# Patient Record
Sex: MALE | Race: WHITE
[De-identification: names, ages, dates, MRNs, and addresses within clinical notes are randomized per-mention and may not be internally consistent; named-entity substitution may affect disease eponyms.]

---

## 2017-01-06 ENCOUNTER — HOSPITAL ENCOUNTER (OUTPATIENT)
Age: 77
Discharge: HOME | End: 2017-01-06
Payer: MEDICARE

## 2017-01-06 DIAGNOSIS — I48.0: Primary | ICD-10-CM

## 2017-01-06 DIAGNOSIS — Z79.01: ICD-10-CM

## 2017-01-19 ENCOUNTER — HOSPITAL ENCOUNTER (OUTPATIENT)
Age: 77
Discharge: HOME | End: 2017-01-19
Payer: MEDICARE

## 2017-01-19 DIAGNOSIS — Z79.01: ICD-10-CM

## 2017-01-19 DIAGNOSIS — I48.0: Primary | ICD-10-CM

## 2017-02-03 ENCOUNTER — HOSPITAL ENCOUNTER (EMERGENCY)
Age: 77
Discharge: HOME | End: 2017-02-03
Payer: MEDICARE
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
## 2017-02-03 DIAGNOSIS — I10: ICD-10-CM

## 2017-02-03 DIAGNOSIS — Z79.02: ICD-10-CM

## 2017-02-03 DIAGNOSIS — Z79.01: ICD-10-CM
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
## 2017-02-03 DIAGNOSIS — I25.10: ICD-10-CM

## 2017-02-03 DIAGNOSIS — I48.91: ICD-10-CM

## 2017-02-03 DIAGNOSIS — S80.12XA: Primary | ICD-10-CM

## 2017-02-03 DIAGNOSIS — E03.9: ICD-10-CM
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
## 2017-02-03 PROCEDURE — 36415 COLL VENOUS BLD VENIPUNCTURE: CPT

## 2017-02-03 PROCEDURE — 73610 X-RAY EXAM OF ANKLE: CPT

## 2017-02-03 PROCEDURE — 83690 ASSAY OF LIPASE: CPT

## 2017-02-03 PROCEDURE — 85610 PROTHROMBIN TIME: CPT

## 2017-02-03 PROCEDURE — 99283 EMERGENCY DEPT VISIT LOW MDM: CPT

## 2017-02-03 PROCEDURE — 73590 X-RAY EXAM OF LOWER LEG: CPT

## 2017-02-03 PROCEDURE — 85025 COMPLETE CBC W/AUTO DIFF WBC: CPT

## 2017-02-03 PROCEDURE — 99284 EMERGENCY DEPT VISIT MOD MDM: CPT

## 2017-02-03 PROCEDURE — 80053 COMPREHEN METABOLIC PANEL: CPT

## 2017-02-22 ENCOUNTER — HOSPITAL ENCOUNTER (OUTPATIENT)
Age: 77
Discharge: HOME | End: 2017-02-22
Payer: MEDICARE

## 2017-02-22 DIAGNOSIS — Z79.01: ICD-10-CM

## 2017-02-22 DIAGNOSIS — I48.0: Primary | ICD-10-CM

## 2017-03-09 ENCOUNTER — HOSPITAL ENCOUNTER (OUTPATIENT)
Age: 77
Discharge: HOME | End: 2017-03-09
Payer: MEDICARE

## 2017-03-09 DIAGNOSIS — Z79.01: ICD-10-CM

## 2017-03-09 DIAGNOSIS — I48.0: Primary | ICD-10-CM

## 2017-03-23 ENCOUNTER — HOSPITAL ENCOUNTER (OUTPATIENT)
Age: 77
End: 2017-03-23
Payer: MEDICARE

## 2017-03-23 DIAGNOSIS — Z79.01: Primary | ICD-10-CM

## 2017-04-12 ENCOUNTER — HOSPITAL ENCOUNTER (OUTPATIENT)
Age: 77
Discharge: HOME | End: 2017-04-12
Payer: MEDICARE

## 2017-04-12 DIAGNOSIS — L03.116: Primary | ICD-10-CM

## 2017-05-04 ENCOUNTER — HOSPITAL ENCOUNTER (OUTPATIENT)
Age: 77
Discharge: HOME | End: 2017-05-04
Payer: MEDICARE

## 2017-05-04 DIAGNOSIS — I48.0: Primary | ICD-10-CM

## 2017-05-04 DIAGNOSIS — Z79.01: ICD-10-CM

## 2017-06-02 ENCOUNTER — HOSPITAL ENCOUNTER (OUTPATIENT)
Dept: HOSPITAL 76 - LAB.F | Age: 77
Discharge: HOME | End: 2017-06-02
Attending: PHARMACIST
Payer: MEDICARE

## 2017-06-02 DIAGNOSIS — I48.0: Primary | ICD-10-CM

## 2017-06-02 DIAGNOSIS — Z79.01: ICD-10-CM

## 2017-06-02 LAB
INR PPP: 2.9 (ref 0.8–1.2)
PROTHROM ACT/NOR PPP: 33.6 SECS (ref 9.9–12.6)

## 2017-06-02 PROCEDURE — 85610 PROTHROMBIN TIME: CPT

## 2017-06-02 PROCEDURE — 36415 COLL VENOUS BLD VENIPUNCTURE: CPT

## 2017-07-12 ENCOUNTER — HOSPITAL ENCOUNTER (OUTPATIENT)
Dept: HOSPITAL 76 - LAB.F | Age: 77
Discharge: HOME | End: 2017-07-12
Attending: PHARMACIST
Payer: MEDICARE

## 2017-07-12 DIAGNOSIS — I48.0: Primary | ICD-10-CM

## 2017-07-12 LAB
INR PPP: 2.3 (ref 0.8–1.2)
PROTHROM ACT/NOR PPP: 25.8 SECS (ref 9.9–12.6)

## 2017-07-12 PROCEDURE — 36415 COLL VENOUS BLD VENIPUNCTURE: CPT

## 2017-07-12 PROCEDURE — 85610 PROTHROMBIN TIME: CPT

## 2017-12-06 ENCOUNTER — HOSPITAL ENCOUNTER (EMERGENCY)
Dept: HOSPITAL 76 - ED | Age: 77
Discharge: HOME | End: 2017-12-06
Payer: MEDICARE

## 2017-12-06 VITALS — SYSTOLIC BLOOD PRESSURE: 143 MMHG | DIASTOLIC BLOOD PRESSURE: 100 MMHG

## 2017-12-06 DIAGNOSIS — E03.9: ICD-10-CM

## 2017-12-06 DIAGNOSIS — N40.0: ICD-10-CM

## 2017-12-06 DIAGNOSIS — I25.10: ICD-10-CM

## 2017-12-06 DIAGNOSIS — J40: Primary | ICD-10-CM

## 2017-12-06 DIAGNOSIS — G47.30: ICD-10-CM

## 2017-12-06 DIAGNOSIS — E78.00: ICD-10-CM

## 2017-12-06 DIAGNOSIS — K21.9: ICD-10-CM

## 2017-12-06 DIAGNOSIS — I10: ICD-10-CM

## 2017-12-06 LAB
ALBUMIN/GLOB SERPL: 1.2 {RATIO} (ref 1–2.2)
ANION GAP SERPL CALCULATED.4IONS-SCNC: 10 MMOL/L (ref 6–13)
BASOPHILS NFR BLD AUTO: 0 10^3/UL (ref 0–0.1)
BASOPHILS NFR BLD AUTO: 0.6 %
BILIRUB BLD-MCNC: 0.8 MG/DL (ref 0.2–1)
BUN SERPL-MCNC: 16 MG/DL (ref 6–20)
CALCIUM UR-MCNC: 8.8 MG/DL (ref 8.5–10.3)
CHLORIDE SERPL-SCNC: 103 MMOL/L (ref 101–111)
CO2 SERPL-SCNC: 27 MMOL/L (ref 21–32)
CREAT SERPLBLD-SCNC: 1.3 MG/DL (ref 0.6–1.2)
EOSINOPHIL # BLD AUTO: 0.3 10^3/UL (ref 0–0.7)
EOSINOPHIL NFR BLD AUTO: 4.7 %
ERYTHROCYTE [DISTWIDTH] IN BLOOD BY AUTOMATED COUNT: 17.2 % (ref 12–15)
GFRSERPLBLD MDRD-ARVRAT: 53 ML/MIN/{1.73_M2} (ref 89–?)
GLOBULIN SER-MCNC: 3.3 G/DL (ref 2.1–4.2)
GLUCOSE SERPL-MCNC: 129 MG/DL (ref 70–100)
HCT VFR BLD AUTO: 39.9 % (ref 42–52)
HGB UR QL STRIP: 13.1 G/DL (ref 14–18)
LIPASE SERPL-CCNC: 42 U/L (ref 22–51)
LYMPHOCYTES # SPEC AUTO: 1.1 10^3/UL (ref 1.5–3.5)
LYMPHOCYTES NFR BLD AUTO: 14.7 %
MCH RBC QN AUTO: 29.4 PG (ref 27–31)
MCHC RBC AUTO-ENTMCNC: 33 G/DL (ref 32–36)
MCV RBC AUTO: 89.2 FL (ref 80–94)
MONOCYTES # BLD AUTO: 0.5 10^3/UL (ref 0–1)
MONOCYTES NFR BLD AUTO: 7.3 %
NEUTROPHILS # BLD AUTO: 5.3 10^3/UL (ref 1.5–6.6)
NEUTROPHILS # SNV AUTO: 7.3 X10^3/UL (ref 4.8–10.8)
NEUTROPHILS NFR BLD AUTO: 72.7 %
NRBC # BLD AUTO: 0.1 /100WBC
PDW BLD AUTO: 7.6 FL (ref 7.4–11.4)
POTASSIUM SERPL-SCNC: 4 MMOL/L (ref 3.5–5)
PROT SPEC-MCNC: 7.3 G/DL (ref 6.7–8.2)
RBC MAR: 4.47 10^6/UL (ref 4.7–6.1)
SODIUM SERPLBLD-SCNC: 140 MMOL/L (ref 135–145)
WBC # BLD: 7.3 X10^3/UL

## 2017-12-06 PROCEDURE — 80053 COMPREHEN METABOLIC PANEL: CPT

## 2017-12-06 PROCEDURE — 94640 AIRWAY INHALATION TREATMENT: CPT

## 2017-12-06 PROCEDURE — 93005 ELECTROCARDIOGRAM TRACING: CPT

## 2017-12-06 PROCEDURE — 96374 THER/PROPH/DIAG INJ IV PUSH: CPT

## 2017-12-06 PROCEDURE — 83690 ASSAY OF LIPASE: CPT

## 2017-12-06 PROCEDURE — 36415 COLL VENOUS BLD VENIPUNCTURE: CPT

## 2017-12-06 PROCEDURE — 85025 COMPLETE CBC W/AUTO DIFF WBC: CPT

## 2017-12-06 PROCEDURE — 99284 EMERGENCY DEPT VISIT MOD MDM: CPT

## 2017-12-06 PROCEDURE — 71020: CPT

## 2017-12-06 PROCEDURE — 99283 EMERGENCY DEPT VISIT LOW MDM: CPT

## 2017-12-06 PROCEDURE — 84484 ASSAY OF TROPONIN QUANT: CPT

## 2017-12-06 NOTE — XRAY PRELIMINARY REPORT
Accession: [id number]

Exam: XR CHEST 2 VIEW PA/LAT

 

IMPRESSION: 

1. Increased interstitial density right lung base. Differential includes edema, airways inflammation,
 pneumonitis or bronchiolitis. 

2. Possible increasing left pleural thickening, pleural fat or pleural mass less likely at lateral le
ft base. Little change over 7 years.

 

[facility]

 

SITE ID: [id number]

## 2017-12-06 NOTE — ED PHYSICIAN DOCUMENTATION
PD HPI URI





- Stated complaint


Stated Complaint: CHEST PX,COUGH,CONGESTION





- Chief complaint


Chief Complaint: Resp





- History obtained from


History obtained from: Patient, Family





- History of Present Illness


Timing - onset: How many days ago (3)


Timing details: Gradual onset, Still present


Associated symptoms: Rhinorrhea, Dry cough.  No: Fever, Chills


Contributing factors: No: Sick contact, COPD / asthma


Worsened by: Activity


Similar symptoms before: No diagnosis


Recently seen: Not recently seen





- Additional information


Additional information: 





Patient is a 76 year old male with multiple co-morbidities who is presenting to 

the emergency department for chest pressure and cough over the last three days.

  patient states that he has not been able to sleep due to the amount that he 

is coughing.  Patient states that he did break a rib from falling a few weeks 

ago as well and it is making every difficult to take a deep breath.   





Review of Systems


Constitutional: denies: Fever, Chills


Eyes: reports: Reviewed and negative


Ears: reports: Reviewed and negative


Nose: reports: Congestion.  denies: Sinus pressure / pain


Throat: denies: Sore throat


Cardiac: reports: Chest pain / pressure.  denies: Pedal edema, Calf pain


Respiratory: reports: Cough, Wheezing


GI: denies: Nausea, Vomiting


: reports: Reviewed and negative


Musculoskeletal: denies: Neck pain, Back pain, Extremity pain


Neurologic: denies: Generalized weakness, Focal weakness, Numbness


Immunocompromised: denies: Immunocompromised





PD PAST MEDICAL HISTORY





- Past Medical History


Cardiovascular: Hypertension, High cholesterol, Coronary artery disease, Atrial 

fibrillation


Respiratory: Sleep apnea, CPAP use


Endocrine/Autoimmune: HyPOthyroidism


GI: None, GERD


: Benign prostate hypertrophy, Retention, Frequency


HEENT: Chronic vision loss, Chronic hearing loss, Other


Psych: Depression


Musculoskeletal: Chronic back pain





- Past Surgical History


Past Surgical History: Yes





- Present Medications


Home Medications: 


 Ambulatory Orders











 Medication  Instructions  Recorded  Confirmed


 


Metoprolol Tartrate 50 mg PO BID #60 tablet 11/16/15 02/03/17


 


Atorvastatin [Lipitor] 80 mg PO DAILY PM 02/03/17 02/03/17


 


Cetirizine [ZyrTEC] 10 mg PO DAILY 02/03/17 02/03/17


 


Cholecalciferol (Vitamin D3) 1,000 unit PO DAILY 02/03/17 02/03/17





[Vitamin D3]   


 


Clopidogrel [Plavix] 75 mg PO DAILY 02/03/17 02/03/17


 


Cyanocobalamin (Vitamin B-12) 1,000 mcg PO DAILY 02/03/17 02/03/17





[Vitamin B-12]   


 


Econazole Nitrate 1 misc TOP DAILY PM 02/03/17 02/03/17


 


Flunisolide 0.025 sprays NEB DAILY 02/03/17 02/03/17


 


Furosemide [Lasix] 120 mg PO DAILY 02/03/17 02/03/17


 


Gabapentin 300 mg PO BID 02/03/17 02/03/17


 


Hydrocodone/Acetaminophen 1 - 2 each PO Q6H PRN #7 tablet 02/03/17 





[Hydrocodon-Acetaminophen 5-325]   


 


Levothyroxine [Synthroid] 100 mcg PO DAILY 02/03/17 02/03/17


 


Metoprolol Tartrate 50 mg PO BID 02/03/17 02/03/17


 


Omeprazole [PriLOSEC] 20 mg PO BID 02/03/17 02/03/17


 


Oxybutynin Chloride 5 mg PO BID 02/03/17 02/03/17


 


Potassium Chloride 20 meq PO DAILY PM 02/03/17 02/03/17


 


Sertraline HCl 50 mg PO DAILY 02/03/17 02/03/17


 


Sodium Chloride [Saline Mist] 2 spray NEB BID 02/03/17 02/03/17


 


Terazosin [Hytrin] 5 mg PO DAILY PM 02/03/17 02/03/17


 


Testosterone 75 gm TD DAILY 02/03/17 02/03/17


 


Warfarin [Coumadin] 4 mg PO 1400 02/03/17 02/03/17


 


Albuterol Sulf [Ventolin Hfa 2 puffs INH Q4HR PRN #1 inhaler 12/06/17 





Inhaler]   


 


Benzonatate [Tessalon Perle] 100 mg PO Q8H #15 capsule 12/06/17 


 


Codeine Phosphate/Guaifenesin 5 ml PO Q8H #100 ml 12/06/17 





[Guaifen-Codeine 100-10 mg/5 ml]   


 


predniSONE [Prednisone] 40 mg PO DAILY 5 Days  tablet 12/06/17 














- Allergies


Allergies/Adverse Reactions: 


 Allergies











Allergy/AdvReac Type Severity Reaction Status Date / Time


 


No Known Drug Allergies Allergy   Verified 12/06/17 19:19














- Social History


Does the pt smoke?: No


Smoking Status: Never smoker


Does the pt drink ETOH?: No


Does the pt have substance abuse?: No





- Immunizations


Immunizations are current?: Yes





- POLST


Patient has POLST: No





PD ED PE NORMAL





- Vitals


Vital signs reviewed: Yes





- General


General: Alert and oriented X 3, Well developed/nourished





- HEENT


HEENT: Atraumatic, PERRL, Pharynx benign





- Neck


Neck: Supple, no meningeal sign, No JVD





- Cardiac


Cardiac: RRR, No murmur





- Abdomen


Abdomen: Soft, Non tender, Non distended





- Derm


Derm: Normal color, Warm and dry, No rash





- Extremities


Extremities: No deformity, No edema





- Neuro


Neuro: Alert and oriented X 3, No motor deficit, No sensory deficit, Normal 

speech





- Psych


Psych: Normal mood





PD ED PE EXPANDED





- HEENT


HEENT: Dry mucous membranes





- Respiratory


Respiratory: Wheezing, Rhonchi, Right upper lobe, Right middle lobe, Right 

lower lobe, Left upper lobe, Left lower lobe.  No: Distress, Accessory mm use





Results





- Vitals


Vitals: 


 Vital Signs - 24 hr











  12/06/17 12/06/17 12/06/17





  19:11 20:40 21:22


 


Temperature 36.6 C  


 


Heart Rate 86 92 93


 


Respiratory 22 16 100 H





Rate   


 


Blood Pressure 138/81 H  


 


O2 Saturation 96  














  12/06/17 12/06/17 12/06/17





  21:30 21:31 22:03


 


Temperature   


 


Heart Rate 106 H 91 98


 


Respiratory 16 15 19





Rate   


 


Blood Pressure  163/93 H 143/100 H


 


O2 Saturation  96 94








 Oxygen











O2 Source                      Room air

















- EKG (time done)


  ** 1916


Rate: Rate (enter#) (76)


Rhythm: Atrial fibrillation


Axis: Normal


Intervals: Normal WA


Compare to prior EKG: Unchanged from prior EKG





- Labs


Labs: 


 Laboratory Tests











  12/06/17 12/06/17 12/06/17





  19:27 19:27 19:27


 


WBC  7.3  


 


RBC  4.47 L  


 


Hgb  13.1 L  


 


Hct  39.9 L  


 


MCV  89.2  


 


MCH  29.4  


 


MCHC  33.0  


 


RDW  17.2 H  


 


Plt Count  192  


 


MPV  7.6  


 


Neut #  5.3  


 


Lymph #  1.1 L  


 


Mono #  0.5  


 


Eos #  0.3  


 


Baso #  0.0  


 


Absolute Nucleated RBC  0.01  


 


Nucleated RBC %  0.1  


 


Sodium   140 


 


Potassium   4.0 


 


Chloride   103 


 


Carbon Dioxide   27 


 


Anion Gap   10.0 


 


BUN   16 


 


Creatinine   1.3 H 


 


Estimated GFR (MDRD)   53 L 


 


Glucose   129 H 


 


Calcium   8.8 


 


Total Bilirubin   0.8 


 


AST   37 


 


ALT   41 


 


Alkaline Phosphatase   84 


 


Troponin I    < 0.04


 


Total Protein   7.3 


 


Albumin   4.0 


 


Globulin   3.3 


 


Albumin/Globulin Ratio   1.2 


 


Lipase   42 














- Rads (name of study)


  ** chest x-ray


Radiology: Final report received (density of right lung base, ), See rad report





PD MEDICAL DECISION MAKING





- ED course


Complexity details: reviewed old records, reviewed results, re-evaluated patient

, considered differential, d/w patient


ED course: 


Patient was seen and examined at bedside.  ekg was performed and showed a fib 

which was normal for the patient.  labs were drawn and chest x-ray was ordered.

  Patient had bilateral wheezing and was treated with duoneb treatment.  

Patient inmproved slightly but continued to have wheezing and was treated with 

additional albuterol treatments and steroids.  Patient ventilation continued to 

improve but patient still had some mild wheezing.  patient stated that he 

wanted to leave and did not want any more treatments.  Patient was given 

detailed discharge and follow up instructions, including the importance of a 

follow up echo.  Patient and friend agreed that they would get close follow up.

  Patient was feeling better, prescriptions were written and patient was stable 

for discharge with outpatient follow up.  








Departure





- Departure


Disposition: 01 Home, Self Care


Clinical Impression: 


 Bronchitis





Condition: Good


Instructions:  ED Bronchitis Asthmatic


Follow-Up: 


Aryan Esparza MD [Primary Care Provider] - Tomorrow


Prescriptions: 


Albuterol Sulf [Ventolin Hfa Inhaler] 2 puffs INH Q4HR PRN #1 inhaler


 PRN Reason: Wheezing


Benzonatate [Tessalon Perle] 100 mg PO Q8H #15 capsule


Codeine Phosphate/Guaifenesin [Guaifen-Codeine 100-10 mg/5 ml] 5 ml PO Q8H #100 

ml


predniSONE [Prednisone] 40 mg PO DAILY 5 Days  tablet


Comments: 


Your diagnostics today were within normal limits.  there was a small amount of 

possibly fluid in your lungs from heart failure and not taking a deep breath 

due to your rib pain.  You also had some wheezing that improved with the 

breathing treatments and steroids.  You will be on the steriods for the next 5 

days, and I will also prescribed an inhaler that you can take every 4 hours.  

Due to your age and risk factors it is also important that you follow up with 

your doctor tomorrow and schedule a follow up appointment and an echo 

cardiogram.  You may return to to the emergency department at any time for new, 

worsening or uncontrollable symptoms.  


Discharge Date/Time: 12/06/17 22:05

## 2017-12-06 NOTE — XRAY REPORT
EXAM:

CHEST RADIOGRAPHY

 

EXAM DATE: 12/6/2017 08:03 PM.

 

CLINICAL HISTORY: Chest pain and cough .

 

COMPARISON: 12/27/2010.

 

TECHNIQUE: 2 views.

 

FINDINGS: 

Lungs/Pleura: There are interstitial densities at the right lung base which appear more conspicuous t
johnson previous. There is a circumscribed density overlying the lower lateral left lung which appears ex
trapulmonary. The shape is similar to previous but appears slightly more prominent.

 

Mediastinum: Heart size is normal. Trachea is midline.

 

Other: Lateral view is limited by motion artifact.

 

IMPRESSION: 

1. Increased interstitial density right lung base. Differential includes edema, airways inflammation,
 pneumonitis or bronchiolitis. 

2. Possible increasing left pleural thickening, pleural fat or pleural mass less likely at lateral le
ft base. Little change over 7 years.

 

[facility]

Referring Provider Line: [phone]

 

SITE ID: [id number]

## 2018-01-08 ENCOUNTER — HOSPITAL ENCOUNTER (OUTPATIENT)
Dept: HOSPITAL 76 - LAB.F | Age: 78
Discharge: HOME | End: 2018-01-08
Attending: PHARMACIST
Payer: MEDICARE

## 2018-01-08 DIAGNOSIS — I48.0: Primary | ICD-10-CM

## 2018-01-08 DIAGNOSIS — Z79.01: ICD-10-CM

## 2018-01-08 LAB
INR PPP: 2.9 (ref 0.8–1.2)
PROTHROM ACT/NOR PPP: 31.2 SECS (ref 9.9–12.6)

## 2018-01-08 PROCEDURE — 85610 PROTHROMBIN TIME: CPT

## 2018-01-08 PROCEDURE — 36415 COLL VENOUS BLD VENIPUNCTURE: CPT

## 2018-01-24 ENCOUNTER — HOSPITAL ENCOUNTER (EMERGENCY)
Dept: HOSPITAL 76 - ED | Age: 78
Discharge: HOME | End: 2018-01-24
Payer: MEDICARE

## 2018-01-24 VITALS — SYSTOLIC BLOOD PRESSURE: 138 MMHG | DIASTOLIC BLOOD PRESSURE: 73 MMHG

## 2018-01-24 DIAGNOSIS — E03.9: ICD-10-CM

## 2018-01-24 DIAGNOSIS — I10: ICD-10-CM

## 2018-01-24 DIAGNOSIS — R06.02: ICD-10-CM

## 2018-01-24 DIAGNOSIS — Z79.01: ICD-10-CM

## 2018-01-24 DIAGNOSIS — E78.00: ICD-10-CM

## 2018-01-24 DIAGNOSIS — I48.91: ICD-10-CM

## 2018-01-24 DIAGNOSIS — J11.1: Primary | ICD-10-CM

## 2018-01-24 LAB — INFLUENZA A & B AG INTERPRET: (no result)

## 2018-01-24 PROCEDURE — 87275 INFLUENZA B AG IF: CPT

## 2018-01-24 PROCEDURE — 87276 INFLUENZA A AG IF: CPT

## 2018-01-24 PROCEDURE — 71046 X-RAY EXAM CHEST 2 VIEWS: CPT

## 2018-01-24 PROCEDURE — 99283 EMERGENCY DEPT VISIT LOW MDM: CPT

## 2018-01-24 PROCEDURE — 94640 AIRWAY INHALATION TREATMENT: CPT

## 2018-01-24 NOTE — XRAY REPORT
EXAM:

CHEST RADIOGRAPHY

 

EXAM DATE: 1/24/2018 07:32 PM.

 

CLINICAL HISTORY: Fever and cough since yesterday.

 

COMPARISON: 12/06/2017.

 

TECHNIQUE: 2 views.

 

FINDINGS: 

Lungs/Pleura: Stable mild scarring in the bases and lateral pleural thickening at the left base. No f
ocal opacities evident. No pleural effusion. No pneumothorax. Hyperinflated lungs.

 

Mediastinum: Heart and mediastinal contours are unremarkable.

 

Other: No compression fractures.

 

IMPRESSION: No acute pulmonary abnormality or interval change.

 

[facility]

Referring Provider Line: [phone]

 

SITE ID: [id number]

## 2018-01-24 NOTE — ED PHYSICIAN DOCUMENTATION
History of Present Illness





- Stated complaint


Stated Complaint: FLU LIKE SYMPTOMS





- Chief complaint


Chief Complaint: Resp





- History obtained from


History obtained from: Patient (2-3 days of fatigue and cough.  no sinus 

congestion.  is on home o2 and has nothad an increase in his o2 need, no fevers

, no chest pain..)





Review of Systems


Constitutional: reports: Fatigue.  denies: Fever, Chills


Nose: denies: Rhinorrhea / runny nose, Congestion, Sinus pressure / pain


Throat: denies: Sore throat, Swollen tonsils


Cardiac: denies: Chest pain / pressure, Palpitations


Respiratory: reports: Cough.  denies: Dyspnea, Hemoptysis, Wheezing


GI: denies: Abdominal Pain, Nausea, Vomiting


: denies: Dysuria


Skin: denies: Rash


Musculoskeletal: denies: Neck pain, Back pain


Neurologic: reports: Generalized weakness.  denies: Headache, LOC





PD PAST MEDICAL HISTORY





- Past Medical History


Past Medical History: Yes


Cardiovascular: Hypertension, High cholesterol, Coronary artery disease, Atrial 

fibrillation


Respiratory: Sleep apnea, CPAP use


Endocrine/Autoimmune: HyPOthyroidism


GI: None, GERD


: Benign prostate hypertrophy, Retention, Frequency


HEENT: Chronic vision loss, Chronic hearing loss, Other


Psych: Depression


Musculoskeletal: Chronic back pain





- Past Surgical History


Past Surgical History: Yes





- Present Medications


Home Medications: 


 Ambulatory Orders











 Medication  Instructions  Recorded  Confirmed


 


Metoprolol Tartrate 50 mg PO BID #60 tablet 11/16/15 02/03/17


 


Atorvastatin [Lipitor] 80 mg PO DAILY PM 02/03/17 02/03/17


 


Cetirizine [ZyrTEC] 10 mg PO DAILY 02/03/17 02/03/17


 


Cholecalciferol (Vitamin D3) 1,000 unit PO DAILY 02/03/17 02/03/17





[Vitamin D3]   


 


Clopidogrel [Plavix] 75 mg PO DAILY 02/03/17 02/03/17


 


Cyanocobalamin (Vitamin B-12) 1,000 mcg PO DAILY 02/03/17 02/03/17





[Vitamin B-12]   


 


Econazole Nitrate 1 misc TOP DAILY PM 02/03/17 02/03/17


 


Flunisolide 0.025 sprays NEB DAILY 02/03/17 02/03/17


 


Furosemide [Lasix] 120 mg PO DAILY 02/03/17 02/03/17


 


Gabapentin 300 mg PO BID 02/03/17 02/03/17


 


Hydrocodone/Acetaminophen 1 - 2 each PO Q6H PRN #7 tablet 02/03/17 





[Hydrocodon-Acetaminophen 5-325]   


 


Levothyroxine [Synthroid] 100 mcg PO DAILY 02/03/17 02/03/17


 


Metoprolol Tartrate 50 mg PO BID 02/03/17 02/03/17


 


Omeprazole [PriLOSEC] 20 mg PO BID 02/03/17 02/03/17


 


Oxybutynin Chloride 5 mg PO BID 02/03/17 02/03/17


 


Potassium Chloride 20 meq PO DAILY PM 02/03/17 02/03/17


 


Sertraline HCl 50 mg PO DAILY 02/03/17 02/03/17


 


Sodium Chloride [Saline Mist] 2 spray NEB BID 02/03/17 02/03/17


 


Terazosin [Hytrin] 5 mg PO DAILY PM 02/03/17 02/03/17


 


Testosterone 75 gm TD DAILY 02/03/17 02/03/17


 


Warfarin [Coumadin] 4 mg PO 1400 02/03/17 02/03/17


 


Albuterol Sulf [Ventolin Hfa 2 puffs INH Q4HR PRN #1 inhaler 12/06/17 





Inhaler]   


 


Benzonatate [Tessalon Perle] 100 mg PO Q8H #15 capsule 12/06/17 


 


Codeine Phosphate/Guaifenesin 5 ml PO Q8H #100 ml 12/06/17 





[Guaifen-Codeine 100-10 mg/5 ml]   


 


predniSONE [Prednisone] 40 mg PO DAILY 5 Days  tablet 12/06/17 


 


Albuterol Sulf [Ventolin Hfa 2 puffs INH Q4HR PRN #1 inhaler 01/24/18 





Inhaler]   


 


Oseltamivir [Tamiflu] 75 mg PO BID #10 capsule 01/24/18 














- Allergies


Allergies/Adverse Reactions: 


 Allergies











Allergy/AdvReac Type Severity Reaction Status Date / Time


 


No Known Drug Allergies Allergy   Verified 01/24/18 18:44














- Social History


Does the pt smoke?: No


Smoking Status: Never smoker


Does the pt drink ETOH?: No


Does the pt have substance abuse?: No





- Immunizations


Immunizations are current?: Yes





- POLST


Patient has POLST: No





PD ED PE NORMAL





- Vitals


Vital signs reviewed: Yes





- General


General: Alert and oriented X 3, No acute distress, Well developed/nourished





- Cardiac


Cardiac: No: RRR (tachycardic)





- Respiratory


Respiratory: No: Clear bilaterally (bilateral rhonchi and wheezing)





- Abdomen


Abdomen: Soft, Non tender





- Derm


Derm: Normal color, No rash





- Extremities


Extremities: No deformity





- Neuro


Neuro: Alert and oriented X 3, No motor deficit, Normal speech


Eye Opening: Spontaneous


Motor: Obeys Commands


Verbal: Oriented


GCS Score: 15





- Psych


Psych: Normal mood, Normal affect





Results





- Vitals


Vitals: 


 Vital Signs - 24 hr











  01/24/18 01/24/18 01/24/18





  18:40 20:00 20:40


 


Temperature 36.0 C L  


 


Heart Rate 102 H 86 93


 


Respiratory 26 H 20 21





Rate   


 


Blood Pressure 139/74 H  138/73 H


 


O2 Saturation 87 L  94








 Oxygen











O2 Source                      Nasal cannula


 


Oxygen Flow Rate               2.5

















- Labs


Labs: 


 Laboratory Tests











  01/24/18





  19:00


 


Influenza A (Rapid)  Negative


 


Influenza B (Rapid)  POSITIVE H


 


Influenza Types A,B Ag  + H














- Rads (name of study)


  ** CXR


Radiology: Final report received (IMPRESSION: No acute pulmonary abnormality or 

interval change.), EMP read contemporaneously





PD MEDICAL DECISION MAKING





- ED course


Complexity details: reviewed results, considered differential, d/w patient


ED course: 





pt is non-toxic, no respiratory distress. is on his home o2.  minimal 

improvement with the neb here in the ER.  pt states he just is fatigued.  he is 

flu positive.  he does meet criteria from CDC for treatment.  will send home 

with meds.  pt given return precautions  . 





Departure





- Departure


Disposition: 01 Home, Self Care


Clinical Impression: 


 Flu, Shortness of breath





Condition: Good


Instructions:  ED Flu


Follow-Up: 


Aryan Esparza MD [Primary Care Provider] - 


Prescriptions: 


Albuterol Sulf [Ventolin Hfa Inhaler] 2 puffs INH Q4HR PRN #1 inhaler


 PRN Reason: Shortness Of Air/Wheezing


Oseltamivir [Tamiflu] 75 mg PO BID #10 capsule


Comments: 


Take all of your medications as instructed.  Return to the ER for any new or 

worsening symptoms.  


Discharge Date/Time: 01/24/18 20:55

## 2018-01-30 ENCOUNTER — HOSPITAL ENCOUNTER (OUTPATIENT)
Dept: HOSPITAL 76 - LAB.F | Age: 78
Discharge: HOME | End: 2018-01-30
Attending: PHARMACIST
Payer: MEDICARE

## 2018-01-30 DIAGNOSIS — Z79.01: ICD-10-CM

## 2018-01-30 DIAGNOSIS — I48.0: Primary | ICD-10-CM

## 2018-01-30 LAB
INR PPP: 1.6 (ref 0.8–1.2)
PROTHROM ACT/NOR PPP: 18 SECS (ref 9.9–12.6)

## 2018-01-30 PROCEDURE — 36415 COLL VENOUS BLD VENIPUNCTURE: CPT

## 2018-01-30 PROCEDURE — 85610 PROTHROMBIN TIME: CPT

## 2018-02-15 ENCOUNTER — HOSPITAL ENCOUNTER (OUTPATIENT)
Dept: HOSPITAL 76 - LAB.F | Age: 78
Discharge: HOME | End: 2018-02-15
Attending: PHARMACIST
Payer: MEDICARE

## 2018-02-15 DIAGNOSIS — I48.0: Primary | ICD-10-CM

## 2018-02-15 DIAGNOSIS — Z79.01: ICD-10-CM

## 2018-02-15 LAB
INR PPP: 4.1 (ref 0.8–1.2)
PROTHROM ACT/NOR PPP: 43.5 SECS (ref 9.9–12.6)

## 2018-02-15 PROCEDURE — 85610 PROTHROMBIN TIME: CPT

## 2018-02-15 PROCEDURE — 36415 COLL VENOUS BLD VENIPUNCTURE: CPT

## 2018-02-22 ENCOUNTER — HOSPITAL ENCOUNTER (OUTPATIENT)
Dept: HOSPITAL 76 - LAB.F | Age: 78
Discharge: HOME | End: 2018-02-22
Attending: PHARMACIST
Payer: MEDICARE

## 2018-02-22 DIAGNOSIS — Z79.01: ICD-10-CM

## 2018-02-22 DIAGNOSIS — I48.0: Primary | ICD-10-CM

## 2018-02-22 LAB
INR PPP: 2.6 (ref 0.8–1.2)
PROTHROM ACT/NOR PPP: 28.5 SECS (ref 9.9–12.6)

## 2018-02-22 PROCEDURE — 36415 COLL VENOUS BLD VENIPUNCTURE: CPT

## 2018-02-22 PROCEDURE — 85610 PROTHROMBIN TIME: CPT

## 2018-03-13 ENCOUNTER — HOSPITAL ENCOUNTER (OUTPATIENT)
Dept: HOSPITAL 76 - LAB.F | Age: 78
End: 2018-03-13
Attending: PHARMACIST
Payer: MEDICARE

## 2018-03-13 ENCOUNTER — HOSPITAL ENCOUNTER (OUTPATIENT)
Dept: HOSPITAL 76 - LAB.F | Age: 78
Discharge: HOME | End: 2018-03-13
Attending: PHARMACIST
Payer: MEDICARE

## 2018-03-13 DIAGNOSIS — I48.91: Primary | ICD-10-CM

## 2018-03-13 DIAGNOSIS — Z79.01: ICD-10-CM

## 2018-03-13 DIAGNOSIS — I48.0: Primary | ICD-10-CM

## 2018-03-13 LAB
INR PPP: 3 (ref 0.8–1.2)
PROTHROM ACT/NOR PPP: 32.8 SECS (ref 9.9–12.6)

## 2018-03-13 PROCEDURE — 36415 COLL VENOUS BLD VENIPUNCTURE: CPT

## 2018-03-13 PROCEDURE — 85610 PROTHROMBIN TIME: CPT

## 2018-03-30 ENCOUNTER — HOSPITAL ENCOUNTER (OUTPATIENT)
Dept: HOSPITAL 76 - LAB.F | Age: 78
Discharge: HOME | End: 2018-03-30
Attending: PHARMACIST
Payer: MEDICARE

## 2018-03-30 DIAGNOSIS — Z79.01: ICD-10-CM

## 2018-03-30 DIAGNOSIS — I48.0: Primary | ICD-10-CM

## 2018-03-30 LAB
INR PPP: 2 (ref 0.8–1.2)
PROTHROM ACT/NOR PPP: 21.6 SECS (ref 9.9–12.6)

## 2018-03-30 PROCEDURE — 36415 COLL VENOUS BLD VENIPUNCTURE: CPT

## 2018-03-30 PROCEDURE — 85610 PROTHROMBIN TIME: CPT

## 2018-05-31 ENCOUNTER — HOSPITAL ENCOUNTER (OUTPATIENT)
Dept: HOSPITAL 76 - LAB.F | Age: 78
Discharge: HOME | End: 2018-05-31
Attending: PHARMACIST
Payer: MEDICARE

## 2018-05-31 DIAGNOSIS — I48.0: Primary | ICD-10-CM

## 2018-05-31 DIAGNOSIS — Z79.01: ICD-10-CM

## 2018-05-31 LAB
INR PPP: 3.2 (ref 0.8–1.2)
PROTHROM ACT/NOR PPP: 34.2 SECS (ref 9.9–12.6)

## 2018-05-31 PROCEDURE — 36415 COLL VENOUS BLD VENIPUNCTURE: CPT

## 2018-05-31 PROCEDURE — 85610 PROTHROMBIN TIME: CPT

## 2018-08-28 ENCOUNTER — HOSPITAL ENCOUNTER (OUTPATIENT)
Dept: HOSPITAL 76 - LAB.F | Age: 78
End: 2018-08-28
Attending: PHARMACIST
Payer: MEDICARE

## 2018-08-28 DIAGNOSIS — I48.0: Primary | ICD-10-CM

## 2018-08-28 DIAGNOSIS — Z79.01: ICD-10-CM

## 2018-08-28 LAB
INR PPP: 2.4 (ref 0.8–1.2)
PROTHROM ACT/NOR PPP: 26.2 SECS (ref 9.9–12.6)

## 2018-08-28 PROCEDURE — 36415 COLL VENOUS BLD VENIPUNCTURE: CPT

## 2018-08-28 PROCEDURE — 85610 PROTHROMBIN TIME: CPT

## 2018-10-10 ENCOUNTER — HOSPITAL ENCOUNTER (EMERGENCY)
Dept: HOSPITAL 76 - ED | Age: 78
Discharge: HOME | End: 2018-10-10
Payer: MEDICARE

## 2018-10-10 VITALS — DIASTOLIC BLOOD PRESSURE: 94 MMHG | SYSTOLIC BLOOD PRESSURE: 132 MMHG

## 2018-10-10 DIAGNOSIS — I10: ICD-10-CM

## 2018-10-10 DIAGNOSIS — N40.1: ICD-10-CM

## 2018-10-10 DIAGNOSIS — R33.8: ICD-10-CM

## 2018-10-10 DIAGNOSIS — Z79.01: ICD-10-CM

## 2018-10-10 DIAGNOSIS — I48.91: ICD-10-CM

## 2018-10-10 DIAGNOSIS — E66.01: ICD-10-CM

## 2018-10-10 DIAGNOSIS — N30.00: Primary | ICD-10-CM

## 2018-10-10 LAB
CLARITY UR REFRACT.AUTO: CLEAR
GLUCOSE UR QL STRIP.AUTO: NEGATIVE MG/DL
KETONES UR QL STRIP.AUTO: NEGATIVE MG/DL
NITRITE UR QL STRIP.AUTO: POSITIVE
PH UR STRIP.AUTO: 6.5 PH (ref 5–7.5)
PROT UR STRIP.AUTO-MCNC: 30 MG/DL
RBC # UR STRIP.AUTO: (no result) /UL
RBC # URNS HPF: (no result) /HPF (ref 0–5)
SP GR UR STRIP.AUTO: 1.01 (ref 1–1.03)
SQUAMOUS URNS QL MICRO: (no result)
UROBILINOGEN UR QL STRIP.AUTO: (no result) E.U./DL
UROBILINOGEN UR STRIP.AUTO-MCNC: NEGATIVE MG/DL
WBC CLUMPS URNS QL MICRO: PRESENT

## 2018-10-10 PROCEDURE — 87181 SC STD AGAR DILUTION PER AGT: CPT

## 2018-10-10 PROCEDURE — 87077 CULTURE AEROBIC IDENTIFY: CPT

## 2018-10-10 PROCEDURE — 81003 URINALYSIS AUTO W/O SCOPE: CPT

## 2018-10-10 PROCEDURE — 81001 URINALYSIS AUTO W/SCOPE: CPT

## 2018-10-10 PROCEDURE — 99283 EMERGENCY DEPT VISIT LOW MDM: CPT

## 2018-10-10 PROCEDURE — 87086 URINE CULTURE/COLONY COUNT: CPT

## 2018-10-10 NOTE — ED PHYSICIAN DOCUMENTATION
PD HPI ABD PAIN





- Stated complaint


Stated Complaint: MALE 





- Chief complaint


Chief Complaint: Abd Pain





- History obtained from


History obtained from: Patient





- History of Present Illness


Timing - onset: Other (For the last week this 77-year-old morbidly obese 

gentleman has had burning dysuria and frequency and incomplete emptying without 

back or abdominal pain or rectal pain.  No fevers.)





Review of Systems


Constitutional: denies: Fever, Chills


Nose: reports: Reviewed and negative


Throat: reports: Reviewed and negative





PD PAST MEDICAL HISTORY





- Past Medical History


Cardiovascular: Hypertension, High cholesterol, Coronary artery disease, Atrial 

fibrillation


Respiratory: Sleep apnea, CPAP use


Endocrine/Autoimmune: HyPOthyroidism


GI: None, GERD


: Benign prostate hypertrophy, Retention, Frequency


HEENT: Chronic vision loss, Chronic hearing loss, Other


Psych: Depression


Musculoskeletal: Chronic back pain





- Past Surgical History


Past Surgical History: Yes





- Present Medications


Home Medications: 


                                Ambulatory Orders











 Medication  Instructions  Recorded  Confirmed


 


Metoprolol Tartrate 50 mg PO BID #60 tablet 11/16/15 02/03/17


 


Atorvastatin [Lipitor] 80 mg PO DAILY PM 02/03/17 02/03/17


 


Cetirizine [ZyrTEC] 10 mg PO DAILY 02/03/17 02/03/17


 


Cholecalciferol (Vitamin D3) 1,000 unit PO DAILY 02/03/17 02/03/17





[Vitamin D3]   


 


Cyanocobalamin (Vitamin B-12) 1,000 mcg PO DAILY 02/03/17 02/03/17





[Vitamin B-12]   


 


Econazole Nitrate 1 misc TOP DAILY PM 02/03/17 02/03/17


 


Flunisolide 0.025 sprays NEB DAILY 02/03/17 02/03/17


 


Furosemide [Lasix] 120 mg PO DAILY 02/03/17 02/03/17


 


Gabapentin 300 mg PO BID 02/03/17 02/03/17


 


Levothyroxine [Synthroid] 100 mcg PO DAILY 02/03/17 02/03/17


 


Metoprolol Tartrate 50 mg PO BID 02/03/17 02/03/17


 


Omeprazole [PriLOSEC] 20 mg PO BID 02/03/17 02/03/17


 


Oxybutynin Chloride 5 mg PO BID 02/03/17 02/03/17


 


Potassium Chloride 20 meq PO DAILY PM 02/03/17 02/03/17


 


Sertraline HCl 50 mg PO DAILY 02/03/17 02/03/17


 


Sodium Chloride [Saline Mist] 2 spray NEB BID 02/03/17 02/03/17


 


Terazosin [Hytrin] 5 mg PO DAILY PM 02/03/17 02/03/17


 


Testosterone 75 gm TD DAILY 02/03/17 02/03/17


 


Warfarin [Coumadin] 4 mg PO 1400 02/03/17 02/03/17


 


Albuterol Sulf [Ventolin Hfa 2 puffs INH Q4HR PRN #1 inhaler 12/06/17 





Inhaler]   


 


Albuterol Sulf [Ventolin Hfa 2 puffs INH Q4HR PRN #1 inhaler 01/24/18 





Inhaler]   


 


Ciprofloxacin HCl [Cipro] 500 mg PO BID #20 tablet 10/10/18 














- Allergies


Allergies/Adverse Reactions: 


                                    Allergies











Allergy/AdvReac Type Severity Reaction Status Date / Time


 


No Known Drug Allergies Allergy   Verified 10/10/18 14:11














- Social History


Does the pt smoke?: No


Smoking Status: Never smoker


Does the pt drink ETOH?: No


Does the pt have substance abuse?: No





- Immunizations


Immunizations are current?: Yes





- POLST


Patient has POLST: No





PD ED PE NORMAL





- Vitals


Vital signs reviewed: Yes





- General


General: Alert and oriented X 3, No acute distress





- Abdomen


Abdomen: Normal bowel sounds, Soft, Non tender





- Male 


Male : Other (Genitalia are normal.  The bladder scan did not get a good read 

I think due to body habitus but using bedside ultrasound he does have a full 

bladder despite just having urinated.)





- Back


Back: No CVA TTP, No spinal TTP





- Neuro


Neuro: Alert and oriented X 3, Normal speech





- Psych


Psych: Normal mood, Normal affect





Results





- Vitals


Vitals: 


                               Vital Signs - 24 hr











  10/10/18





  14:09


 


Temperature 36.6 C


 


Heart Rate 80


 


Respiratory 20





Rate 


 


Blood Pressure 142/101 H


 


O2 Saturation 91 L








                                     Oxygen











O2 Source                      Room air

















PD MEDICAL DECISION MAKING





- ED course


ED course: 





77-year-old gentleman presents with UTI symptoms, some concern for urinary 

retention based on bedside ultrasound, but after ordering a catheter he had 

copious urine output, about 500 mL's and refused Rosenthal.





- Sepsis Event


Vital Signs: 


                               Vital Signs - 24 hr











  10/10/18





  14:09


 


Temperature 36.6 C


 


Heart Rate 80


 


Respiratory 20





Rate 


 


Blood Pressure 142/101 H


 


O2 Saturation 91 L








                                     Oxygen











O2 Source                      Room air

















Departure





- Departure


Disposition: 01 Home, Self Care


Clinical Impression: 


Urinary tract infection


Qualifiers:


 Urinary tract infection type: acute cystitis Hematuria presence: without 

hematuria Qualified Code(s): N30.00 - Acute cystitis without hematuria





Condition: Good


Record reviewed to determine appropriate education?: Yes


Instructions:  ED UTI Cystitis Male


Prescriptions: 


Ciprofloxacin HCl [Cipro] 500 mg PO BID #20 tablet


Comments: 


We will culture your urine, the results should be done in 48-72 hours.  If an 

antibiotic change is necessary we will call you.  Return if worse in the 

meantime, especially if you develop increasing flank pain, fevers, or cannot 

keep down the medication.





Often times when you start antibiotics while you are taking anticoagulants such 

as Coumadin or warfarin, your INR will go up.  You need to have your INR checked

 frequently while on the antibiotics.  Have it checked in 3 days, again in 6 

days, and at least weekly while you are on antibiotics.  Dose adjustments of 

your anticoagulants may be necessary.

## 2018-10-18 ENCOUNTER — HOSPITAL ENCOUNTER (OUTPATIENT)
Dept: HOSPITAL 76 - LAB.F | Age: 78
Discharge: HOME | End: 2018-10-18
Attending: PHARMACIST
Payer: MEDICARE

## 2018-10-18 DIAGNOSIS — I48.0: Primary | ICD-10-CM

## 2018-10-18 DIAGNOSIS — Z79.01: ICD-10-CM

## 2018-10-18 LAB
INR PPP: 2.6 (ref 0.8–1.2)
PROTHROM ACT/NOR PPP: 28.7 SECS (ref 9.9–12.6)

## 2018-10-18 PROCEDURE — 85610 PROTHROMBIN TIME: CPT

## 2018-10-18 PROCEDURE — 36415 COLL VENOUS BLD VENIPUNCTURE: CPT

## 2018-11-07 ENCOUNTER — HOSPITAL ENCOUNTER (OUTPATIENT)
Dept: HOSPITAL 76 - LAB.F | Age: 78
Discharge: HOME | End: 2018-11-07
Attending: PHARMACIST
Payer: MEDICARE

## 2018-11-07 DIAGNOSIS — Z79.01: ICD-10-CM

## 2018-11-07 DIAGNOSIS — I48.0: Primary | ICD-10-CM

## 2018-11-07 LAB
INR PPP: 3.7 (ref 0.8–1.2)
PROTHROM ACT/NOR PPP: 41.8 SECS (ref 9.9–12.6)

## 2018-11-07 PROCEDURE — 36415 COLL VENOUS BLD VENIPUNCTURE: CPT

## 2018-11-07 PROCEDURE — 85610 PROTHROMBIN TIME: CPT

## 2018-12-13 ENCOUNTER — HOSPITAL ENCOUNTER (OUTPATIENT)
Dept: HOSPITAL 76 - LAB.F | Age: 78
Discharge: HOME | End: 2018-12-13
Attending: PHARMACIST
Payer: MEDICARE

## 2018-12-13 DIAGNOSIS — Z79.01: ICD-10-CM

## 2018-12-13 DIAGNOSIS — I48.0: Primary | ICD-10-CM

## 2018-12-13 LAB
INR PPP: 2.4 (ref 0.8–1.2)
PROTHROM ACT/NOR PPP: 27.3 SECS (ref 9.9–12.6)

## 2018-12-13 PROCEDURE — 85610 PROTHROMBIN TIME: CPT

## 2018-12-13 PROCEDURE — 36415 COLL VENOUS BLD VENIPUNCTURE: CPT

## 2019-03-12 ENCOUNTER — HOSPITAL ENCOUNTER (OUTPATIENT)
Dept: HOSPITAL 76 - LAB.F | Age: 79
Discharge: HOME | End: 2019-03-12
Attending: PHARMACIST
Payer: MEDICARE

## 2019-03-12 DIAGNOSIS — Z79.01: ICD-10-CM

## 2019-03-12 DIAGNOSIS — I48.0: Primary | ICD-10-CM

## 2019-03-12 LAB
INR PPP: 1.7 (ref 0.8–1.2)
PROTHROM ACT/NOR PPP: 19.1 SECS (ref 9.9–12.6)

## 2019-03-12 PROCEDURE — 85610 PROTHROMBIN TIME: CPT

## 2019-03-12 PROCEDURE — 36415 COLL VENOUS BLD VENIPUNCTURE: CPT

## 2019-04-04 ENCOUNTER — HOSPITAL ENCOUNTER (OUTPATIENT)
Dept: HOSPITAL 76 - LAB.F | Age: 79
Discharge: HOME | End: 2019-04-04
Attending: PHARMACIST
Payer: MEDICARE

## 2019-04-04 DIAGNOSIS — Z79.01: ICD-10-CM

## 2019-04-04 DIAGNOSIS — I48.0: Primary | ICD-10-CM

## 2019-04-04 LAB
INR PPP: 2.5 (ref 0.8–1.2)
PROTHROM ACT/NOR PPP: 27.7 SECS (ref 9.9–12.6)

## 2019-04-04 PROCEDURE — 85610 PROTHROMBIN TIME: CPT

## 2019-04-04 PROCEDURE — 36415 COLL VENOUS BLD VENIPUNCTURE: CPT

## 2019-07-31 ENCOUNTER — HOSPITAL ENCOUNTER (OUTPATIENT)
Dept: HOSPITAL 76 - LAB.S | Age: 79
Discharge: HOME | End: 2019-07-31
Attending: PHARMACIST
Payer: MEDICARE

## 2019-07-31 DIAGNOSIS — I48.0: Primary | ICD-10-CM

## 2019-07-31 DIAGNOSIS — Z79.01: ICD-10-CM

## 2019-07-31 LAB
INR PPP: 4.4 (ref 0.8–1.2)
PROTHROM ACT/NOR PPP: 49 SECS (ref 9.9–12.6)

## 2019-07-31 PROCEDURE — 85610 PROTHROMBIN TIME: CPT

## 2019-07-31 PROCEDURE — 36415 COLL VENOUS BLD VENIPUNCTURE: CPT

## 2019-08-05 ENCOUNTER — HOSPITAL ENCOUNTER (OUTPATIENT)
Age: 79
Discharge: HOME | End: 2019-08-05
Payer: MEDICARE

## 2019-09-26 ENCOUNTER — HOSPITAL ENCOUNTER (OUTPATIENT)
Dept: HOSPITAL 76 - LAB.S | Age: 79
Discharge: HOME | End: 2019-09-26
Attending: PHARMACIST
Payer: MEDICARE

## 2019-09-26 DIAGNOSIS — Z79.01: ICD-10-CM

## 2019-09-26 DIAGNOSIS — I48.0: Primary | ICD-10-CM

## 2019-09-26 LAB
INR PPP: 2.6 (ref 0.8–1.2)
PROTHROM ACT/NOR PPP: 28.4 SECS (ref 9.9–12.6)

## 2019-09-26 PROCEDURE — 85610 PROTHROMBIN TIME: CPT

## 2019-09-26 PROCEDURE — 36415 COLL VENOUS BLD VENIPUNCTURE: CPT

## 2019-10-18 ENCOUNTER — HOSPITAL ENCOUNTER (OUTPATIENT)
Dept: HOSPITAL 76 - LAB.S | Age: 79
Discharge: HOME | End: 2019-10-18
Attending: PHARMACIST
Payer: MEDICARE

## 2019-10-18 DIAGNOSIS — I48.0: Primary | ICD-10-CM

## 2019-10-18 DIAGNOSIS — Z79.01: ICD-10-CM

## 2019-10-18 LAB
INR PPP: 3.4 (ref 0.8–1.2)
PROTHROM ACT/NOR PPP: 36.4 SECS (ref 9.9–12.6)

## 2019-10-18 PROCEDURE — 85610 PROTHROMBIN TIME: CPT

## 2019-10-18 PROCEDURE — 36415 COLL VENOUS BLD VENIPUNCTURE: CPT

## 2019-12-09 ENCOUNTER — HOSPITAL ENCOUNTER (OUTPATIENT)
Dept: HOSPITAL 76 - LAB.S | Age: 79
Discharge: HOME | End: 2019-12-09
Attending: PHARMACIST
Payer: MEDICARE

## 2019-12-09 DIAGNOSIS — Z79.01: ICD-10-CM

## 2019-12-09 DIAGNOSIS — I48.0: Primary | ICD-10-CM

## 2019-12-09 LAB
INR PPP: 1.9 (ref 0.8–1.2)
PROTHROM ACT/NOR PPP: 21.3 SECS (ref 9.9–12.6)

## 2019-12-09 PROCEDURE — 85610 PROTHROMBIN TIME: CPT

## 2019-12-09 PROCEDURE — 36415 COLL VENOUS BLD VENIPUNCTURE: CPT

## 2019-12-24 ENCOUNTER — HOSPITAL ENCOUNTER (OUTPATIENT)
Dept: HOSPITAL 76 - EMS | Age: 79
Discharge: ANOTHER HOSPITAL | End: 2019-12-24
Attending: SURGERY
Payer: MEDICARE

## 2019-12-24 ENCOUNTER — HOSPITAL ENCOUNTER (OUTPATIENT)
Dept: HOSPITAL 76 - ED | Age: 79
Setting detail: OBSERVATION
LOS: 1 days | Discharge: HOME | End: 2019-12-25
Attending: INTERNAL MEDICINE | Admitting: INTERNAL MEDICINE
Payer: MEDICARE

## 2019-12-24 DIAGNOSIS — R42: Primary | ICD-10-CM

## 2019-12-24 DIAGNOSIS — R55: Primary | ICD-10-CM

## 2019-12-24 DIAGNOSIS — R33.8: ICD-10-CM

## 2019-12-24 DIAGNOSIS — E86.0: ICD-10-CM

## 2019-12-24 DIAGNOSIS — Z95.5: ICD-10-CM

## 2019-12-24 DIAGNOSIS — Z90.79: ICD-10-CM

## 2019-12-24 DIAGNOSIS — I10: ICD-10-CM

## 2019-12-24 DIAGNOSIS — Z85.47: ICD-10-CM

## 2019-12-24 DIAGNOSIS — B37.2: ICD-10-CM

## 2019-12-24 DIAGNOSIS — R55: ICD-10-CM

## 2019-12-24 DIAGNOSIS — F32.9: ICD-10-CM

## 2019-12-24 DIAGNOSIS — Z79.82: ICD-10-CM

## 2019-12-24 DIAGNOSIS — S20.211A: ICD-10-CM

## 2019-12-24 DIAGNOSIS — R07.81: ICD-10-CM

## 2019-12-24 DIAGNOSIS — G47.00: ICD-10-CM

## 2019-12-24 DIAGNOSIS — I48.11: ICD-10-CM

## 2019-12-24 DIAGNOSIS — Z91.81: ICD-10-CM

## 2019-12-24 DIAGNOSIS — R35.0: ICD-10-CM

## 2019-12-24 DIAGNOSIS — N40.1: ICD-10-CM

## 2019-12-24 DIAGNOSIS — R51: ICD-10-CM

## 2019-12-24 DIAGNOSIS — Z66: ICD-10-CM

## 2019-12-24 DIAGNOSIS — E78.5: ICD-10-CM
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
## 2019-12-24 DIAGNOSIS — K21.9: ICD-10-CM

## 2019-12-24 DIAGNOSIS — N32.81: ICD-10-CM

## 2019-12-24 DIAGNOSIS — D72.829: ICD-10-CM

## 2019-12-24 DIAGNOSIS — E03.9: ICD-10-CM

## 2019-12-24 DIAGNOSIS — Z79.01: ICD-10-CM

## 2019-12-24 DIAGNOSIS — G47.33: ICD-10-CM

## 2019-12-24 DIAGNOSIS — R11.0: ICD-10-CM

## 2019-12-24 DIAGNOSIS — R61: ICD-10-CM

## 2019-12-24 DIAGNOSIS — Z79.899: ICD-10-CM

## 2019-12-24 DIAGNOSIS — E66.01: ICD-10-CM

## 2019-12-24 DIAGNOSIS — I25.10: ICD-10-CM

## 2019-12-24 LAB
ALBUMIN DIAFP-MCNC: 2.8 G/DL (ref 3.2–5.5)
ALBUMIN/GLOB SERPL: 1.3 {RATIO} (ref 1–2.2)
ALP SERPL-CCNC: 41 IU/L (ref 42–121)
ALT SERPL W P-5'-P-CCNC: 38 IU/L (ref 10–60)
ANION GAP SERPL CALCULATED.4IONS-SCNC: 8 MMOL/L (ref 6–13)
AST SERPL W P-5'-P-CCNC: 26 IU/L (ref 10–42)
BASOPHILS NFR BLD AUTO: 0 10^3/UL (ref 0–0.1)
BASOPHILS NFR BLD AUTO: 0.2 %
BILIRUB BLD-MCNC: 0.8 MG/DL (ref 0.2–1)
BUN SERPL-MCNC: 25 MG/DL (ref 6–20)
CALCIUM UR-MCNC: 7.4 MG/DL (ref 8.5–10.3)
CHLORIDE SERPL-SCNC: 105 MMOL/L (ref 101–111)
CLARITY UR REFRACT.AUTO: CLEAR
CO2 SERPL-SCNC: 27 MMOL/L (ref 21–32)
CREAT SERPLBLD-SCNC: 1.1 MG/DL (ref 0.6–1.2)
EOSINOPHIL # BLD AUTO: 0.1 10^3/UL (ref 0–0.7)
EOSINOPHIL NFR BLD AUTO: 0.9 %
ERYTHROCYTE [DISTWIDTH] IN BLOOD BY AUTOMATED COUNT: 15.6 % (ref 12–15)
GFRSERPLBLD MDRD-ARVRAT: 64 ML/MIN/{1.73_M2} (ref 89–?)
GLOBULIN SER-MCNC: 2.2 G/DL (ref 2.1–4.2)
GLUCOSE SERPL-MCNC: 170 MG/DL (ref 70–100)
GLUCOSE UR QL STRIP.AUTO: NEGATIVE MG/DL
HGB UR QL STRIP: 10.9 G/DL (ref 14–18)
INR PPP: 1.5 (ref 0.8–1.2)
KETONES UR QL STRIP.AUTO: NEGATIVE MG/DL
LIPASE SERPL-CCNC: 28 U/L (ref 22–51)
LYMPHOCYTES # SPEC AUTO: 1.6 10^3/UL (ref 1.5–3.5)
LYMPHOCYTES NFR BLD AUTO: 11.2 %
MCH RBC QN AUTO: 30.9 PG (ref 27–31)
MCHC RBC AUTO-ENTMCNC: 31.6 G/DL (ref 32–36)
MCV RBC AUTO: 97.7 FL (ref 80–94)
MONOCYTES # BLD AUTO: 0.7 10^3/UL (ref 0–1)
MONOCYTES NFR BLD AUTO: 5.2 %
NEUTROPHILS # BLD AUTO: 11.4 10^3/UL (ref 1.5–6.6)
NEUTROPHILS # SNV AUTO: 13.9 X10^3/UL (ref 4.8–10.8)
NEUTROPHILS NFR BLD AUTO: 81.8 %
NITRITE UR QL STRIP.AUTO: NEGATIVE
PDW BLD AUTO: 9.1 FL (ref 7.4–11.4)
PH UR STRIP.AUTO: 6 PH (ref 5–7.5)
PLATELET # BLD: 187 10^3/UL (ref 130–450)
PROT SPEC-MCNC: 5 G/DL (ref 6.7–8.2)
PROT UR STRIP.AUTO-MCNC: NEGATIVE MG/DL
PROTHROM ACT/NOR PPP: 17.2 SECS (ref 9.9–12.6)
RBC # UR STRIP.AUTO: NEGATIVE /UL
RBC MAR: 3.53 10^6/UL (ref 4.7–6.1)
SODIUM SERPLBLD-SCNC: 140 MMOL/L (ref 135–145)
SP GR UR STRIP.AUTO: 1.01 (ref 1–1.03)
UROBILINOGEN UR QL STRIP.AUTO: (no result) E.U./DL
UROBILINOGEN UR STRIP.AUTO-MCNC: NEGATIVE MG/DL

## 2019-12-24 PROCEDURE — 85610 PROTHROMBIN TIME: CPT

## 2019-12-24 PROCEDURE — 70450 CT HEAD/BRAIN W/O DYE: CPT

## 2019-12-24 PROCEDURE — 74177 CT ABD & PELVIS W/CONTRAST: CPT

## 2019-12-24 PROCEDURE — 99285 EMERGENCY DEPT VISIT HI MDM: CPT

## 2019-12-24 PROCEDURE — 36415 COLL VENOUS BLD VENIPUNCTURE: CPT

## 2019-12-24 PROCEDURE — 87086 URINE CULTURE/COLONY COUNT: CPT

## 2019-12-24 PROCEDURE — 71260 CT THORAX DX C+: CPT

## 2019-12-24 PROCEDURE — 80053 COMPREHEN METABOLIC PANEL: CPT

## 2019-12-24 PROCEDURE — 96361 HYDRATE IV INFUSION ADD-ON: CPT

## 2019-12-24 PROCEDURE — 84484 ASSAY OF TROPONIN QUANT: CPT

## 2019-12-24 PROCEDURE — 80048 BASIC METABOLIC PNL TOTAL CA: CPT

## 2019-12-24 PROCEDURE — 96374 THER/PROPH/DIAG INJ IV PUSH: CPT

## 2019-12-24 PROCEDURE — 85027 COMPLETE CBC AUTOMATED: CPT

## 2019-12-24 PROCEDURE — 96375 TX/PRO/DX INJ NEW DRUG ADDON: CPT

## 2019-12-24 PROCEDURE — 93005 ELECTROCARDIOGRAM TRACING: CPT

## 2019-12-24 PROCEDURE — 81003 URINALYSIS AUTO W/O SCOPE: CPT

## 2019-12-24 PROCEDURE — 87640 STAPH A DNA AMP PROBE: CPT

## 2019-12-24 PROCEDURE — 85025 COMPLETE CBC W/AUTO DIFF WBC: CPT

## 2019-12-24 PROCEDURE — 83690 ASSAY OF LIPASE: CPT

## 2019-12-24 PROCEDURE — 81001 URINALYSIS AUTO W/SCOPE: CPT

## 2019-12-24 RX ADMIN — SODIUM CHLORIDE SCH MLS/HR: 9 INJECTION, SOLUTION INTRAVENOUS at 23:20

## 2019-12-24 NOTE — CT REPORT
Reason:  IV only, RUQ pain, anticoagulated

Procedure Date:  12/24/2019   

Accession Number:  [id number] / [id number]                    

Procedure:  CT  - Abdomen/Pelvis W CPT Code:  

 

***Final Report***

 

 

FULL RESULT:

 

 

EXAM:

CT ABDOMEN AND PELVIS

 

EXAM DATE: 12/24/2019 09:16 PM.

 

CLINICAL HISTORY: Right upper quadrant pain. Anticoagulated.

 

COMPARISONS: CHEST W/ 12/24/2019 8:46 PM.

 

TECHNIQUE: Routine helical CT imaging was performed through the abdomen 

and pelvis. IV contrast: 100 cc of Optiray 320. Enteric contrast: No. 

Reconstructions: Coronal and sagittal.

 

In accordance with CT protocol optimization, one or more of the following 

dose reduction techniques were utilized for this exam: automated exposure 

control, adjustment of mA and/or KV based on patient size, or use of 

iterative reconstructive technique.

 

FINDINGS:

Lung Bases: Unremarkable.

 

Liver: Normal. No masses.

 

Gallbladder/Bile Ducts: Unremarkable.

 

Spleen: Normal.

 

Pancreas: Normal.

 

Adrenal Glands: Normal.

 

Kidneys: Multiple bilateral cysts, otherwise unremarkable.

 

Peritoneal Cavity/Bowel: Normal. No free fluid, free air or adenopathy. 

No masses or acute inflammatory process. Appendectomy clips noted.

 

Pelvic Organs: Normal. The bladder and visualized pelvic organs are 

within normal limits.

 

Vasculature: No aortic aneurysm. Mild atherosclerotic calcification. 

Proximal celiac artery stenosis.

 

Bones: Degenerative disk disease at L5-S1.

 

Other: Lateral abdominal wall muscular thickening.

IMPRESSION:

1. Thickening of the right lateral abdominal wall musculature compatible 

with intramuscular hematoma.

2. Proximal celiac artery stenosis noted.

 

[facility]IA

## 2019-12-24 NOTE — CT REPORT
Reason:  R chest wall pain

Procedure Date:  12/24/2019   

Accession Number:  [id number] / [id number]                    

Procedure:  CT  - CHEST W CPT Code:  

 

***Final Report***

 

 

FULL RESULT:

 

 

EXAM:

CT CHEST

 

EXAM DATE: 12/24/2019 09:16 PM.

 

CLINICAL HISTORY: Right chest wall pain.

 

COMPARISONS: CHEST 2 VIEW 01/24/2018 7:26 PM.

 

TECHNIQUE: Routine helical CT imaging was performed through the chest. IV 

contrast: 100 cc of Optiray 320. Reconstructions: Coronal and sagittal.

 

In accordance with CT protocol optimization, one or more of the following 

dose reduction techniques were utilized for this exam: automated exposure 

control, adjustment of mA and/or KV based on patient size, or use of 

iterative reconstructive technique.

 

FINDINGS:

Lungs/Pleura: No nodules, bronchial thickening, consolidation, or edema. 

Pulmonary vasculature is normal. No pericardial or pleural effusion. No 

pneumothorax.

 

Mediastinum: Mild aortic and severe left main coronary artery 

calcification noted. No adenopathy or masses. The heart and great vessels 

are normal.

 

Bones: Sclerotic focus, sternal manubrium.

 

Other: Thickening of the lateral right chest wall musculature.

IMPRESSION:

1. Lateral right chest wall muscular thickening compatible with 

intramuscular hematoma.

2. Clear lungs.

3. No fracture identified.

4. Sclerotic focus, sternal manubrium, likely bone island.

 

[facility]

## 2019-12-24 NOTE — HISTORY & PHYSICAL EXAMINATION
Chief Complaint





- Chief Complaint


Chief Complaint: syncope





History of Present Illness





- Admitted From


Admitted From:: [facility]





- History Obtained From


Records Reviewed: yes


History obtained from: patient and spouse





- History of Present Illness


HPI Comment/Other: 


Patient is an 77 y/o morbidly obese male with multiple co-morbidities including 

CAD, Atrial fibrillation on coumadin, JOSELIN who presented to the ED after a 

syncopal episode at home. This evening he sneezed and started experiencing 

excruciating right-sided chest wall/ rib pain that caused him to double over. 

This had also happened a few days prior when he coughed. About 1 hour after the 

incident this evening he stood up from a sitting position and suddenly felt 

dizzy. He had a blank stare and his knees started to buckle. His wife came to 

his aid and guided him back into the chair. He was also clammy and pale at the 

time. As a result his wife called EMS. He denies a previous occurrence. He 

denies chest pain, abd pain, nausea or vomiting. He always has some dypnea on 

exertion.


He complained of a headache and reports that he had a mechanical fall 1 week ago

where he slipped on a rug and hit his head. His wife adds that he has not been 

eating or drinking much lately


Work up in the ED included a CT of the chest, abd/pelvis with contrast. It was 

noted that he had a chest wall hematoma. He was presented for admission for 

further evaluation and treatment.











History





- Past Medical History


Cardiovascular: reports: Hypertension, High cholesterol, Coronary artery 

disease, Atrial fibrillation


Respiratory: reports: Sleep apnea, CPAP use


Endocrine/Autoimmune: reports: HyPOthyroidism


GI: reports: None, GERD


: reports: Benign prostate hypertrophy, Retention, Frequency


HEENT: reports: Chronic vision loss, Chronic hearing loss, Other


Psych: reports: Depression


Musculoskeletal: reports: Chronic back pain


MRSA Hx?: No


Other Past Medical History: Hx of testicular cancer with recurrence.  s/p 

bliateral orchiectomy





- Past Surgical History


Ortho: reports: Rotator cuff repair


/GYN: reports: Other (bilateral orchiectomy)





- Family & Social History


Family History Comment/Other: father:  of old age. Also had emphysema (was a

smoker).  mother:  of old age.  sister1 :  from surgical complications. 

sister2:  from cardiomypathy 2/2 viral infection


Living arrangement: At home


Living Situation: With spouse/s.o.


Social History Notes: He does not smoke, drink alcohol or use illicit drugs.





- POLST


Patient has POLST: No


POLST Status: DNR





Meds/Allgy





- Home Medications


Home Medications: 


                                Ambulatory Orders











 Medication  Instructions  Recorded  Confirmed


 


Atorvastatin [Lipitor] 80 mg PO DAILY PM 17


 


Cholecalciferol (Vitamin D3) 1,000 unit PO DAILY 17





[Vitamin D3]   


 


Cyanocobalamin (Vitamin B-12) 1,000 mcg PO DAILY 17





[Vitamin B-12]   


 


Econazole Nitrate 1 misc TOP DAILY PM 17


 


Flunisolide 0.025 sprays NEB DAILY 17


 


Furosemide [Lasix] 80 mg PO DAILY 17


 


Gabapentin 900 mg PO BID 17


 


Levothyroxine [Synthroid] 100 mcg PO DAILY 17


 


Metoprolol Tartrate 50 mg PO BID 17


 


Omeprazole [PriLOSEC] 20 mg PO BID 17


 


Oxybutynin Chloride 5 mg PO DAILY 17


 


Potassium Chloride 20 meq PO DAILY PM 17


 


Sodium Chloride [Saline Mist] 2 spray NEB BID 17


 


Warfarin [Coumadin] 4 mg PO QPM 17


 


Albuterol Sulf [Ventolin Hfa 2 puffs INH Q4HR PRN #1 inhaler 18 





Inhaler]   


 


Aspirin 81 mg PO DAILY 19


 


Colchicine 0.6 mg PO PRN PRN 19


 


Duloxetine HCl [Cymbalta] 60 mg PO DAILY 19


 


Ferrous Sulfate 325 mg PO DAILY 19


 


Finasteride 5 mg PO QPM 19


 


Loratadine [Claritin] 10 mg PO BID 19


 


Mirabegron [Myrbetriq] 25 mg PO BID 19


 


Tamsulosin [Flomax] 0.8 mg PO QPM 19


 


Testosterone [Androgel] 0.5 packet BID 19


 


Trazodone HCl 300 mg PO QPM 19














- Allergies


Allergies/Adverse Reactions: 


                                    Allergies











Allergy/AdvReac Type Severity Reaction Status Date / Time


 


No Known Drug Allergies Allergy   Verified 19 20:15














Review of Systems





- Constitutional


Constitutional: denies: Fatigue, Fever, Chills





- Eyes


Eyes: denies: Pain, Dipolpia





- Ears, Nose & Throat


Ears, Nose & Throat: reports: Hearing loss (chronic).  denies: Vertigo, Sore 

throat





- Cardiovascular


Cariovascular: reports: Irregular heart rate, Syncope.  denies: Edema, 

Exertional dyspnea





- Respiratory


Respiratory: denies: Cough, Sputum production, Wheezing, Hemoptysis, SOB at rest





- Gastrointestinal


Gastrointestinal: denies: Abdominal pain, Constipation, Diarrhea, Black stools, 

Nausea, Vomiting, Coffee grounds emesis, Other (obese abdomen)





- Genitourinary


Genitourinary: denies: Dysuria, Frequency, Urgency, Hematuria, Incontinence, 

Flank pain





- Musculoskeletal


Musculoskeletal: reports: Muscle pain (right-sided chest wall)





- Integumentary


Integumentary: denies: Rash, Pruritis, Lesions





- Neurological


Neurological: reports: Dizziness.  denies: General weakness, Focal weakness, 

Headache





- Psychiatric


Psychiatric: denies: Depression, Anxiety





- Endocrine


Endocrine: denies: Polyuria, Polydypsia





- Hematologic/Lymphatic


Hematologic/Lymphatic: denies: Anemia, Bruising


Prior Level of Functionality: 


He can dress himself, take a bath, change his clothing and get in and out of bed

 on his own. He can also feed himself. It has been getting difficult to groom 

himself because he cannot stand for long. He mainly gets around the house by 

furniture surfing. He has a cane but does not use it. He has a scooter to get 

around when he is out on appointments. He does not use in the the house because 

there is not enough room to maneuver it.








Exam





- Vital Signs


Vital Signs: 





                                Vital Signs x48h











  Temp Pulse Resp BP Pulse Ox


 


 19 19:47  35 C L  60  18  129/59 L  91 L


 


 19 19:40      86 L














- Physical Exam


General Appearance: positive: Alert, Moderate distress


Eyes Bilateral: positive: Normal inspection, PERRL, EOMI


ENT: positive: ENT inspection nml


Neck: positive: Nml inspection, No JVD, Trachea midline


Respiratory: positive: No respiratory distress, Breath sounds nml, Other 

(right-sided chest wall tenderness).  negative: Chest non-tender, Wheezes, 

Rales, Rhonchi


Cardiovascular: positive: Irregularly irregular


Abdomen: positive: Non-tender, No organomegaly, Nml bowel sounds, No distention,

 Other (obese abdomen).  negative: Guarding, Rebound


Back: positive: Nml inspection


Skin: positive: Color nml, Warm, Dry.  negative: No rash


Extremities: positive: Non-tender, Nml appearance, No pedal edema


Neurologic/Psychiatric: positive: Oriented x3, Mood/affect nml





Conclusion/Plan





- Problem List


(1) Syncope


Conclusion/Plan: 


Etiology undetermined


?2/2 vaso-vagal vs dehydration


IV hydration with normal saline


Trend troponin.


Check orthostatics qshift


2D echo


Will hold metoprololo for tonight and reassess in the morning.


Will hold lasix while hydrating patient


Qualifiers: 


   Syncope type: unspecified   Qualified Code(s): R55 - Syncope and collapse   





(2) Chest wall hematoma


Conclusion/Plan: 


No active extravasation of contrast.


Will hold coumadin for now and monitor H&H


Qualifiers: 


   Encounter type: initial encounter   Laterality: right   Qualified Code(s): 

S20.211A - Contusion of right front wall of thorax, initial encounter   





(3) Atrial fibrillation


Conclusion/Plan: 


On coumadin.


INR 1.5. Will hold coumadin while monitoring H&H


in light of reported hematoma.





Qualifiers: 


   Atrial fibrillation type: longstanding persistent   Qualified Code(s): I48.11

 - Longstanding persistent atrial fibrillation   





(4) Coronary artery disease


Conclusion/Plan: 


Pt has historry of PCI with 1 stent placed 5 years ago.


Severe calcification seen in left main coronary artery on chest CT done today.


Patient is scheduled to follow up with his cardiologist


Dr Murphy Benavides in [place] on 2020.


They are to discuss referral for angiogram/angioplasty.


 Metoprolol held for now due to syncope.


Continue aspirin and atorvastatin








(5) Hyperlipidemia


Conclusion/Plan: 


Continue atorvastatin








(6) Hypothyroidism


Conclusion/Plan: 


Resume synthroid once verified








(7) BPH (benign prostatic hyperplasia)


Conclusion/Plan: 


On finasteride and tamsulosin








(8) GERD (gastroesophageal reflux disease)


Conclusion/Plan: 


Protonix ordered








(9) Overactive bladder


Conclusion/Plan: 


On myrbetriq and oxybutynin








(10) Insomnia


Conclusion/Plan: 


On trazodone








(11) Depression


Conclusion/Plan: 


On cymbalta








(12) Leukocytosis


Conclusion/Plan: 


Likely reactive. Will monitor








(13) JOSELIN on CPAP


Conclusion/Plan: 


Continue home CPAP








(14) Fall


Conclusion/Plan: 


mechanical. (slipped on rug)


Occurred 1 week ago. Hit his head


In light of persistent headache and being on coumadin


will order CT of the brain w/o contrast.








(15) Candidiasis


Conclusion/Plan: 


Nystatin powder ordered








- Lab Results


Fish Bones: 


                                 19 04:15





                                 19 04:15





Core Measures





- Anticipated LOS


I expect patient to be DC'd or transferred within 96 hours.: Yes





- DVT/VTE - Prophylaxis


VTE/DVT Device ordered at admit?: Yes


VTE/DVT Prophylaxis med ordered at admit?: Yes

## 2019-12-24 NOTE — ED PHYSICIAN DOCUMENTATION
PD HPI SYNCOPE





- Stated complaint


Stated Complaint: SYNCOPE, RT RIB PAIN S/P SNEEZE





- Chief complaint


Chief Complaint: Neuro





- History obtained from


History obtained from: Patient (This is an 78-year-old gentleman with history of

atrial fibrillation on warfarin, and coronary disease.  Few days ago he sneezed 

and developed right-sided and right upper quadrant pain, and today sneezed again

and the pain recurred.  About an hour after that he was going out for food and 

developed dizziness and had several syncopal episodes.  He is short of breath 

but is chronically short of breath and says that is no different than normal.  

He denies chest pain other than the right-sided chest pain from sneezing.)





Review of Systems


Ten Systems: 10 systems reviewed and negative


Constitutional: denies: Fever, Chills


Cardiac: reports: Chest pain / pressure.  denies: Palpitations


Respiratory: reports: Dyspnea.  denies: Cough


GI: reports: Abdominal Pain.  denies: Nausea, Vomiting, Bloody / black stool





PD PAST MEDICAL HISTORY





- Past Medical History


Cardiovascular: Hypertension, High cholesterol, Coronary artery disease, Atrial 

fibrillation


Respiratory: Sleep apnea, CPAP use


Endocrine/Autoimmune: HyPOthyroidism


GI: None, GERD


: Benign prostate hypertrophy, Retention, Frequency


HEENT: Chronic vision loss, Chronic hearing loss, Other


Psych: Depression


Musculoskeletal: Chronic back pain





- Past Surgical History


Past Surgical History: Yes





- Present Medications


Home Medications: 


                                Ambulatory Orders











 Medication  Instructions  Recorded  Confirmed


 


Atorvastatin [Lipitor] 80 mg PO DAILY PM 02/03/17 02/03/17


 


Cholecalciferol (Vitamin D3) 1,000 unit PO DAILY 02/03/17 02/03/17





[Vitamin D3]   


 


Cyanocobalamin (Vitamin B-12) 1,000 mcg PO DAILY 02/03/17 02/03/17





[Vitamin B-12]   


 


Econazole Nitrate 1 misc TOP DAILY PM 02/03/17 02/03/17


 


Flunisolide 0.025 sprays NEB DAILY 02/03/17 02/03/17


 


Furosemide [Lasix] 80 mg PO DAILY 02/03/17 02/03/17


 


Gabapentin 900 mg PO BID 02/03/17 02/03/17


 


Levothyroxine [Synthroid] 100 mcg PO DAILY 02/03/17 02/03/17


 


Metoprolol Tartrate 50 mg PO BID 02/03/17 02/03/17


 


Omeprazole [PriLOSEC] 20 mg PO BID 02/03/17 02/03/17


 


Oxybutynin Chloride 5 mg PO DAILY 02/03/17 02/03/17


 


Potassium Chloride 20 meq PO DAILY PM 02/03/17 02/03/17


 


Sodium Chloride [Saline Mist] 2 spray NEB BID 02/03/17 02/03/17


 


Warfarin [Coumadin] 4 mg PO QPM 02/03/17 02/03/17


 


Albuterol Sulf [Ventolin Hfa 2 puffs INH Q4HR PRN #1 inhaler 01/24/18 





Inhaler]   


 


Aspirin 81 mg PO DAILY 12/24/19 12/24/19


 


Colchicine 0.6 mg PO PRN PRN 12/24/19 12/24/19


 


Duloxetine HCl [Cymbalta] 60 mg PO DAILY 12/24/19 12/24/19


 


Ferrous Sulfate 325 mg PO DAILY 12/24/19 12/24/19


 


Finasteride 5 mg PO QPM 12/24/19 12/24/19


 


Loratadine [Claritin] 10 mg PO BID 12/24/19 12/24/19


 


Mirabegron [Myrbetriq] 25 mg PO BID 12/24/19 12/24/19


 


Tamsulosin [Flomax] 0.8 mg PO QPM 12/24/19 12/24/19


 


Testosterone [Androgel] 0.5 packet BID 12/24/19 12/24/19


 


Trazodone HCl 300 mg PO QPM 12/24/19 12/24/19














- Allergies


Allergies/Adverse Reactions: 


                                    Allergies











Allergy/AdvReac Type Severity Reaction Status Date / Time


 


No Known Drug Allergies Allergy   Verified 12/24/19 20:15














- Social History


Does the pt smoke?: No


Smoking Status: Never smoker


Does the pt drink ETOH?: No


Does the pt have substance abuse?: No





- Immunizations


Immunizations are current?: Yes





- POLST


Patient has POLST: No





PD ED PE NORMAL





- Vitals


Vital signs reviewed: Yes





- General


General: Alert and oriented X 3, Other (He appears uncomfortable and is 

clutching his right upper quadrant)





- HEENT


HEENT: PERRL, EOMI





- Neck


Neck: Supple, no meningeal sign, No bony TTP





- Cardiac


Cardiac: RRR, No murmur





- Respiratory


Respiratory: No respiratory distress, Clear bilaterally





- Abdomen


Abdomen: Other (Right upper quadrant tenderness)





- Extremities


Extremities: No edema, No calf tenderness / cord





- Neuro


Neuro: Alert and oriented X 3, Normal speech





Results





- Vitals


Vitals: 


                               Vital Signs - 24 hr











  12/24/19 12/24/19





  19:40 19:47


 


Temperature  35 C L


 


Heart Rate  60


 


Respiratory  18





Rate  


 


Blood Pressure  129/59 L


 


O2 Saturation 86 L 91 L








                                     Oxygen











O2 Source                      Room air

















- EKG (time done)


  ** 1953


Rate: Rate (enter#) (84)


Rhythm: Atrial fibrillation


Axis: Normal


QRS: Normal


Ischemia: Non specific changes


Computer interpretation: Agree with computer





- Labs


Labs: 


                                Laboratory Tests











  12/24/19 12/24/19 12/24/19





  19:58 19:58 19:58


 


WBC  13.9 H  


 


RBC  3.53 L  


 


Hgb  10.9 L  


 


Hct  34.5 L  


 


MCV  97.7 H  


 


MCH  30.9  


 


MCHC  31.6 L  


 


RDW  15.6 H  


 


Plt Count  187  


 


MPV  9.1  


 


Neut # (Auto)  11.4 H  


 


Lymph # (Auto)  1.6  


 


Mono # (Auto)  0.7  


 


Eos # (Auto)  0.1  


 


Baso # (Auto)  0.0  


 


Absolute Nucleated RBC  0.00  


 


Nucleated RBC %  0.0  


 


PT   17.2 H 


 


INR   1.5 H 


 


Sodium    140


 


Potassium    3.5


 


Chloride    105


 


Carbon Dioxide    27


 


Anion Gap    8.0


 


BUN    25 H


 


Creatinine    1.1


 


Estimated GFR (MDRD)    64 L


 


Glucose    170 H


 


Calcium    7.4 L


 


Total Bilirubin    0.8


 


AST    26


 


ALT    38


 


Alkaline Phosphatase    41 L


 


Troponin I High Sens   


 


Total Protein    5.0 L


 


Albumin    2.8 L


 


Globulin    2.2


 


Albumin/Globulin Ratio    1.3


 


Lipase    28














  12/24/19





  19:58


 


WBC 


 


RBC 


 


Hgb 


 


Hct 


 


MCV 


 


MCH 


 


MCHC 


 


RDW 


 


Plt Count 


 


MPV 


 


Neut # (Auto) 


 


Lymph # (Auto) 


 


Mono # (Auto) 


 


Eos # (Auto) 


 


Baso # (Auto) 


 


Absolute Nucleated RBC 


 


Nucleated RBC % 


 


PT 


 


INR 


 


Sodium 


 


Potassium 


 


Chloride 


 


Carbon Dioxide 


 


Anion Gap 


 


BUN 


 


Creatinine 


 


Estimated GFR (MDRD) 


 


Glucose 


 


Calcium 


 


Total Bilirubin 


 


AST 


 


ALT 


 


Alkaline Phosphatase 


 


Troponin I High Sens  4.3


 


Total Protein 


 


Albumin 


 


Globulin 


 


Albumin/Globulin Ratio 


 


Lipase 














- Rads (name of study)


  ** Ct Chest/abd pelvis


Radiology: EMP read contemporaneously (Right chest wall hematoma, lungs are 

normal, bone island in the sternum, celiac artery stenosis)





PD MEDICAL DECISION MAKING





- ED course


ED course: 





78-year-old gentleman with multiple medical issues had 3 syncopal episodes 

tonight preceded by a painful right chest/abdominal pain preceded by sneezing 

and found to have a chest wall hematoma and thankfully no intra-abdominal organ 

injury.  Given his advanced age and comorbidities should still be observed for 

the syncope and a call was placed to Dr. Campos at 10:10 PM.


The admitting physician asked me to give the radiologist called to see if there 

is any active contrast extravasation, study was limited to look at this but 

there was nothing obvious.





Departure





- Departure


Disposition: ED Place in Observation


Clinical Impression: 


Atrial fibrillation


Qualifiers:


 Atrial fibrillation type: longstanding persistent Qualified Code(s): I48.11 - 

Longstanding persistent atrial fibrillation





Syncope


Qualifiers:


 Syncope type: unspecified Qualified Code(s): R55 - Syncope and collapse





Chest wall hematoma


Qualifiers:


 Encounter type: initial encounter Laterality: right Qualified Code(s): S20.211A

 - Contusion of right front wall of thorax, initial encounter





Condition: Fair

## 2019-12-25 VITALS — SYSTOLIC BLOOD PRESSURE: 144 MMHG | DIASTOLIC BLOOD PRESSURE: 68 MMHG

## 2019-12-25 LAB
ANION GAP SERPL CALCULATED.4IONS-SCNC: 8 MMOL/L (ref 6–13)
BASOPHILS NFR BLD AUTO: 0 10^3/UL (ref 0–0.1)
BASOPHILS NFR BLD AUTO: 0.3 %
BUN SERPL-MCNC: 28 MG/DL (ref 6–20)
CALCIUM UR-MCNC: 8.3 MG/DL (ref 8.5–10.3)
CHLORIDE SERPL-SCNC: 100 MMOL/L (ref 101–111)
CO2 SERPL-SCNC: 30 MMOL/L (ref 21–32)
CREAT SERPLBLD-SCNC: 1.4 MG/DL (ref 0.6–1.2)
EOSINOPHIL # BLD AUTO: 0 10^3/UL (ref 0–0.7)
EOSINOPHIL NFR BLD AUTO: 0.3 %
ERYTHROCYTE [DISTWIDTH] IN BLOOD BY AUTOMATED COUNT: 15.7 % (ref 12–15)
ERYTHROCYTE [DISTWIDTH] IN BLOOD BY AUTOMATED COUNT: 15.8 % (ref 12–15)
GFRSERPLBLD MDRD-ARVRAT: 49 ML/MIN/{1.73_M2} (ref 89–?)
GLUCOSE SERPL-MCNC: 140 MG/DL (ref 70–100)
HGB UR QL STRIP: 11 G/DL (ref 14–18)
HGB UR QL STRIP: 9.7 G/DL (ref 14–18)
INR PPP: 1.4 (ref 0.8–1.2)
LYMPHOCYTES # SPEC AUTO: 1.4 10^3/UL (ref 1.5–3.5)
LYMPHOCYTES NFR BLD AUTO: 10.8 %
MCH RBC QN AUTO: 29.6 PG (ref 27–31)
MCH RBC QN AUTO: 31 PG (ref 27–31)
MCHC RBC AUTO-ENTMCNC: 30.7 G/DL (ref 32–36)
MCHC RBC AUTO-ENTMCNC: 31.7 G/DL (ref 32–36)
MCV RBC AUTO: 96.3 FL (ref 80–94)
MCV RBC AUTO: 97.7 FL (ref 80–94)
MONOCYTES # BLD AUTO: 0.9 10^3/UL (ref 0–1)
MONOCYTES NFR BLD AUTO: 6.6 %
NEUTROPHILS # BLD AUTO: 10.6 10^3/UL (ref 1.5–6.6)
NEUTROPHILS # SNV AUTO: 10 X10^3/UL (ref 4.8–10.8)
NEUTROPHILS # SNV AUTO: 13 X10^3/UL (ref 4.8–10.8)
NEUTROPHILS NFR BLD AUTO: 81.4 %
PDW BLD AUTO: 8.9 FL (ref 7.4–11.4)
PDW BLD AUTO: 9.3 FL (ref 7.4–11.4)
PLATELET # BLD: 156 10^3/UL (ref 130–450)
PLATELET # BLD: 208 10^3/UL (ref 130–450)
PROTHROM ACT/NOR PPP: 15.4 SECS (ref 9.9–12.6)
RBC MAR: 3.28 10^6/UL (ref 4.7–6.1)
RBC MAR: 3.55 10^6/UL (ref 4.7–6.1)
SODIUM SERPLBLD-SCNC: 138 MMOL/L (ref 135–145)

## 2019-12-25 RX ADMIN — HYDROCODONE BITARTRATE AND ACETAMINOPHEN PRN TAB: 7.5; 325 TABLET ORAL at 12:12

## 2019-12-25 RX ADMIN — NYSTATIN SCH APPLIC: 100000 POWDER TOPICAL at 09:17

## 2019-12-25 RX ADMIN — NYSTATIN SCH APPLIC: 100000 POWDER TOPICAL at 03:27

## 2019-12-25 RX ADMIN — HYDROCODONE BITARTRATE AND ACETAMINOPHEN PRN TAB: 7.5; 325 TABLET ORAL at 03:26

## 2019-12-25 RX ADMIN — SODIUM CHLORIDE SCH MLS/HR: 9 INJECTION, SOLUTION INTRAVENOUS at 09:17

## 2019-12-25 NOTE — PHARMACY PROGRESS NOTE
- Best Possible Medication History


Admit Date and Time: 12/24/19 8708


Medication History completed: Yes


Patient Interview: Completed





As the person ultimately responsible for medication therapy, providers are able 

to order a medication from an existing home medication list in [facility] via the 

"Reconcile Routine" prior to Confirmation of that medication by support staff. 

Such practice is discouraged except when the physician, in their clinical 

judgment, deems that a medical need exists for a medication without regard to 

previous use.

## 2019-12-25 NOTE — DISCHARGE SUMMARY
Discharge Summary


Admit Date: 12/24/19


Discharge Date: 12/25/19


Discharging Provider: Dr Maureen Valadez


Primary Care Provider: Dr Aryan Esparza


Code Status: Do Not Attempt Resuscitation


Condition at Discharge: Stable


Discharge Disposition: 01 Home, Self Care





- DIAGNOSES


Admission Diagnoses: 





(1) Syncope





(2) Chest wall hematoma





(3) Atrial fibrillation





(4) Coronary artery disease





(5) Hyperlipidemia





(6) Hypothyroidism





(7) BPH (benign prostatic hyperplasia)





(8) GERD (gastroesophageal reflux disease)





(9) Overactive bladder





(10) Insomnia





(11) Depression





(12) Leukocytosis





(13) JOSELIN on CPAP





(14) Fall





(15) Candidiasis





Discharge Diagnoses with Status of Each Condition: 





See below








- HPI


History of Present Illness: 





From the admission H&P of Dr Kanika Campos:


Patient is an 77 y/o morbidly obese male with multiple co-morbidities including 

CAD, Atrial fibrillation on coumadin, JOSELIN who presented to the ED after a 

syncopal episode at home. This evening he sneezed and started experiencing 

excruciating right-sided chest wall/ rib pain that caused him to double over. 

This had also happened a few days prior when he coughed. About 1 hour after the 

incident this evening he stood up from a sitting position and suddenly felt 

dizzy. He had a blank stare and his knees started to buckle. His wife came to 

his aid and guided him back into the chair. He was also clammy and pale at the 

time. As a result his wife called EMS. He denies a previous occurrence. He 

denies chest pain, abd pain, nausea or vomiting. He always has some dypnea on 

exertion.


He complained of a headache and reports that he had a mechanical fall 1 week ago

where he slipped on a rug and hit his head. His wife adds that he has not been 

eating or drinking much lately


Work up in the ED included a CT of the chest, abd/pelvis with contrast. It was 

noted that he had a chest wall hematoma. He was presented for admission for 

further evaluation and treatment








- HOSPITAL COURSE


Hospital Course: 





(1) Syncope


Etiology was probably vaso-vagal and dehydration. He received IV hydration with 

normal saline and lasix was on hold. Orthostatic check was stable the next day. 

An Echo was planned but not available (over the Edd week here). He was 

discharged with advice to resume all his meds except not the Lasix for about a 

week, and his dose of Metoprolol Tartrate 50 mg bid was changed to a long acting

agent, but at a lower dose: Metoprolol Succinate 25 mg bid. He was advised light

activity and be seen by PCP or his Cardiologist in the next 5-7 days.





(2) Chest wall hematoma


No active extravasation of contrast was seen on imaging. The coumadin was OK'd 

to restart.





(3) Atrial fibrillation


On coumadin. The B-blocker dose was changed as in #1.





(4) Coronary artery disease


Pt has history of PCI with a stent placed 5 years ago.  Severe calcification 

seen in left main coronary artery on chest CT done today.  Patient is scheduled 

to follow up with his cardiologist Dr Murphy Benavides in [place] on 01/14/2020. 

They are to discuss referral for angiogram/angioplasty. His hs-troponins were no

t elevated. He was kept on his aspirin and atorvastatin then resumed on is B-

blocker and Coumadin at discharge.


 


(5) Hyperlipidemia


Continued on atorvastatin








(6) Hypothyroidism


Synthroid was continued








(7) BPH (benign prostatic hyperplasia)


He was kept on finasteride and tamsulosin








(8) GERD (gastroesophageal reflux disease)


Protonix ordered








(9) Overactive bladder


On myrbetriq and oxybutynin








(10) Insomnia


His trazodone was continued








(11) Depression


On cymbalta while here








(12) Leukocytosis


Likely reactive as there were no clinical signs of infection.








(13) JOSELIN on CPAP


Continued home CPAP








(14) Fall


A mechanical fall (slipped on rug) occurred 1 week ago. Hit his head.  In light 

of persistent headache and being on coumadin, a CT of the brain w/o contrast was

done that showed no trauma.








(15) Candidiasis


He had red and foul-smelling areas in his folds, for which Nystatin powder 

ordered.








- ALLERGIES


Allergies/Adverse Reactions: 


                                    Allergies











Allergy/AdvReac Type Severity Reaction Status Date / Time


 


No Known Drug Allergies Allergy   Verified 12/24/19 20:15














- MEDICATIONS


Home Medications: 


                                Ambulatory Orders











 Medication  Instructions  Recorded  Confirmed


 


Cholecalciferol (Vitamin D3) 1,000 unit PO BID 02/03/17 12/25/19





[Vitamin D3]   


 


Cyanocobalamin (Vitamin B-12) 1,000 mcg PO DAILY 02/03/17 12/25/19





[Vitamin B-12]   


 


Gabapentin 900 mg PO BID 02/03/17 12/25/19


 


Levothyroxine [Synthroid] 100 mcg PO QDAC 02/03/17 12/25/19


 


Omeprazole [PriLOSEC] 20 mg PO BID 02/03/17 12/25/19


 


Oxybutynin Chloride 10 mg PO DAILY 02/03/17 12/25/19


 


Potassium Chloride 20 meq PO DAILY PM 02/03/17 12/25/19


 


Aspirin 81 mg PO DAILY 12/24/19 12/25/19


 


Colchicine 0.6 mg PO PRN PRN 12/24/19 12/25/19


 


Duloxetine HCl [Cymbalta] 60 mg PO DAILY 12/24/19 12/25/19


 


Ferrous Sulfate 325 mg PO DAILY 12/24/19 12/25/19


 


Finasteride 5 mg PO QPM 12/24/19 12/25/19


 


Loratadine [Claritin] 10 mg PO QPM 12/24/19 12/25/19


 


Mirabegron [Myrbetriq] 25 mg PO DAILY 12/24/19 12/25/19


 


Tamsulosin [Flomax] 0.8 mg PO QPM 12/24/19 12/25/19


 


Testosterone [Androgel] 5 gm TOP DAILY 12/24/19 12/25/19


 


Trazodone HCl 300 mg PO QPM 12/24/19 12/24/19


 


Ascorbic Acid 1,000 mg PO BID 12/25/19 12/25/19


 


Atorvastatin Calcium 80 mg PO QPM 12/25/19 12/25/19


 


Calcium Carbonate 500 mg PO BID 12/25/19 12/25/19


 


Metoprolol Succinate [Toprol Xl] 25 mg PO BID #60 tab.er.24h 12/25/19 


 


Nystatin 1 each MC BID #14 powder.ea. 12/25/19 


 


Warfarin Sodium 2 mg PO TUWE@2100 12/25/19 12/25/19


 


Warfarin Sodium 4 mg PO SUMOTHFRSA@2100 12/25/19 12/25/19














- PHYSICAL EXAM AT DISCHARGE


General Appearance: positive: No acute distress, Other (Obese, has long hair and

disheveled appearance.)


Eyes Bilateral: positive: Normal inspection, EOMI


ENT: positive: ENT inspection nml, No signs of dehydration


Neck: positive: Nml inspection


Respiratory: positive: No respiratory distress, Breath sounds nml


Cardiovascular: positive: Regular rate & rhythm, No murmur


Abdomen: positive: Non-tender, Other (Oese with a pannus)


Extremities: positive: No pedal edema


Neurologic/Psychiatric: positive: Oriented x3, Other (Non-focal)





- LABS


Result Diagrams: 


                                 12/25/19 11:05





                                 12/25/19 04:15





- DIAGNOSTIC IMAGING


Diagnostic Imaging Results: Final report reviewed





- FOLLOW UP


Follow Up: 





See PCP as per routine and keep upcoming appointment with Cardiology.





- TIME SPENT


Time Spent in Discharge (Minutes): 30

## 2019-12-25 NOTE — DISCHARGE PLAN
Discharge Plan


Problem Reviewed?: Yes


Disposition: 01 Home, Self Care


Condition: Stable


Prescriptions: 


Metoprolol Succinate [Toprol Xl] 25 mg PO BID #60 tab.er.24h


Nystatin 1 each MC BID #14 powder.ea.


Diet: Diabetic


Activity Restrictions: Activity as Tolerated


Instruction Topics:  Dehydration


Health Concerns: 


You were hospitalized because of a fainting spell.  It appears that you were 

dehydrated and your blood pressure medications were excessive.  You received iv 

hydration. The Lasix was stopped.  The Metoprolol was on hold.





An Echocardiogram (ultrasound of the heart) was planned for more evaluation of 

why you fainted, but we do not have that available for a week over the holiday 

period.  You should have an outpatient Echo, if your PCP or Cardiologist want it

done.





You were also found to have a small hematoma (bruise) on the chest wall, 

possibly from the severe coughing spell.  Your Coumadin was on hold for a day 

but it can be resumed now.





You have a yeast infection of the skin of ypur groin and are being prescribed a 

powder to treat it.





You are being discharged and should resume all your prehospital medications 

EXCEPT no Lasix (water pill) and the metoprolol pill was changed to a lower dose

but long-acting agent.  The new prescriptions were sent to your pharmacy 

electronically.





You should have light activity and be seen by your PCP or your Cardiologist in 

the next 5-7 days.





If you have new or worsening symptoms, call your PCP or come to the ER.





Plan of Treatment: 


As above.





Care Goals: 


Stabilization of symptoms and improvement is the goal.





Assessment: 


The patient understands.





No Smoking: If you smoke, Please STOP!  Call [phone] for help.


Follow-up with: 


Aryan Esparza MD [Primary Care Provider] -
resilient/elastic

## 2019-12-25 NOTE — CT REPORT
Reason:  s/p fall, hit head, on coumadin,

Procedure Date:  12/25/2019   

Accession Number:  [id number] / [id number]                    

Procedure:  CT  - HEAD WO CPT Code:  

 

***Final Report***

 

 

FULL RESULT:

 

 

EXAM:

CT HEAD

 

EXAM DATE: 12/25/2019 01:08 AM.

 

CLINICAL HISTORY: S/p fall, hit head, on coumadin.

 

COMPARISON: None.

 

TECHNIQUE: Multiaxial CT images were obtained from the foramen magnum to 

the vertex. Reformats: Sagittal and coronal. IV contrast: None.

 

In accordance with CT protocol optimization, one or more of the following 

dose reduction techniques were utilized for this exam: automated exposure 

control, adjustment of mA and/or KV based on patient size, or use of 

iterative reconstructive technique.

 

FINDINGS:

Parenchyma: No intraparenchymal hemorrhage. No evidence of mass, midline 

shift, or CT findings of acute infarction. Gray-white differentiation is 

distinct. Diffuse chronic microangiopathic white matter changes are 

evident.

 

Extraaxial Spaces: Normal for age. No subdural or epidural collections 

identified.

 

Ventricles: The ventricles and cortical sulci are enlarged, consistent 

with age-related tissue loss.

 

Sinuses and orbits: Imaged paranasal sinuses, orbits, and mastoids show 

no significant abnormality.

 

Bones: No evidence of fracture or calvarial defect.

 

Other: None.

IMPRESSION: Generalized age-related cortical atrophic changes without 

evidence of acute intracranial abnormality.

 

RADIA

## 2020-08-03 ENCOUNTER — HOSPITAL ENCOUNTER (EMERGENCY)
Dept: HOSPITAL 76 - ED | Age: 80
Discharge: HOME | End: 2020-08-03
Payer: MEDICARE

## 2020-08-03 VITALS — SYSTOLIC BLOOD PRESSURE: 148 MMHG | DIASTOLIC BLOOD PRESSURE: 78 MMHG

## 2020-08-03 DIAGNOSIS — I10: ICD-10-CM

## 2020-08-03 DIAGNOSIS — Z79.82: ICD-10-CM

## 2020-08-03 DIAGNOSIS — K52.9: Primary | ICD-10-CM

## 2020-08-03 DIAGNOSIS — H91.90: ICD-10-CM

## 2020-08-03 DIAGNOSIS — I25.10: ICD-10-CM

## 2020-08-03 LAB
ALBUMIN DIAFP-MCNC: 3.8 G/DL (ref 3.2–5.5)
ALBUMIN/GLOB SERPL: 1.3 {RATIO} (ref 1–2.2)
ALP SERPL-CCNC: 57 IU/L (ref 42–121)
ALT SERPL W P-5'-P-CCNC: 24 IU/L (ref 10–60)
ANION GAP SERPL CALCULATED.4IONS-SCNC: 11 MMOL/L (ref 6–13)
AST SERPL W P-5'-P-CCNC: 21 IU/L (ref 10–42)
BASOPHILS NFR BLD AUTO: 0 10^3/UL (ref 0–0.1)
BASOPHILS NFR BLD AUTO: 0.3 %
BILIRUB BLD-MCNC: 0.8 MG/DL (ref 0.2–1)
BUN SERPL-MCNC: 12 MG/DL (ref 6–20)
CALCIUM UR-MCNC: 8.7 MG/DL (ref 8.5–10.3)
CHLORIDE SERPL-SCNC: 106 MMOL/L (ref 101–111)
CO2 SERPL-SCNC: 27 MMOL/L (ref 21–32)
CREAT SERPLBLD-SCNC: 1.2 MG/DL (ref 0.6–1.2)
EOSINOPHIL # BLD AUTO: 0.2 10^3/UL (ref 0–0.7)
EOSINOPHIL NFR BLD AUTO: 2.4 %
ERYTHROCYTE [DISTWIDTH] IN BLOOD BY AUTOMATED COUNT: 14.8 % (ref 12–15)
GLOBULIN SER-MCNC: 2.9 G/DL (ref 2.1–4.2)
GLUCOSE SERPL-MCNC: 145 MG/DL (ref 70–100)
HGB UR QL STRIP: 12.5 G/DL (ref 14–18)
LIPASE SERPL-CCNC: 29 U/L (ref 22–51)
LYMPHOCYTES # SPEC AUTO: 0.8 10^3/UL (ref 1.5–3.5)
LYMPHOCYTES NFR BLD AUTO: 11.8 %
MCH RBC QN AUTO: 31.4 PG (ref 27–31)
MCHC RBC AUTO-ENTMCNC: 32.1 G/DL (ref 32–36)
MCV RBC AUTO: 97.7 FL (ref 80–94)
MONOCYTES # BLD AUTO: 0.5 10^3/UL (ref 0–1)
MONOCYTES NFR BLD AUTO: 6.3 %
NEUTROPHILS # BLD AUTO: 5.6 10^3/UL (ref 1.5–6.6)
NEUTROPHILS # SNV AUTO: 7.1 X10^3/UL (ref 4.8–10.8)
NEUTROPHILS NFR BLD AUTO: 78.6 %
PDW BLD AUTO: 8.7 FL (ref 7.4–11.4)
PLATELET # BLD: 139 10^3/UL (ref 130–450)
PROT SPEC-MCNC: 6.7 G/DL (ref 6.7–8.2)
RBC MAR: 3.98 10^6/UL (ref 4.7–6.1)
SODIUM SERPLBLD-SCNC: 144 MMOL/L (ref 135–145)

## 2020-08-03 PROCEDURE — 85025 COMPLETE CBC W/AUTO DIFF WBC: CPT

## 2020-08-03 PROCEDURE — 99283 EMERGENCY DEPT VISIT LOW MDM: CPT

## 2020-08-03 PROCEDURE — 96374 THER/PROPH/DIAG INJ IV PUSH: CPT

## 2020-08-03 PROCEDURE — 83690 ASSAY OF LIPASE: CPT

## 2020-08-03 PROCEDURE — 83605 ASSAY OF LACTIC ACID: CPT

## 2020-08-03 PROCEDURE — 36415 COLL VENOUS BLD VENIPUNCTURE: CPT

## 2020-08-03 PROCEDURE — 80053 COMPREHEN METABOLIC PANEL: CPT

## 2020-08-03 NOTE — ED PHYSICIAN DOCUMENTATION
PD HPI ABD PAIN





- Stated complaint


Stated Complaint: DIARRHEA, NAUSEA, VOMITING





- Chief complaint


Chief Complaint: Abd Pain





- History obtained from


History obtained from: Patient, Family (wife)





- Additional information


Additional information: 





79-year-old gentleman with history of coronary disease, A. fib, no longer on 

anticoagulation because of falls risk and recurrent bruising presents with a 

day's worth of abdominal cramping, vomiting and diarrhea.  No blood from either 

end.  No respiratory symptoms.  No fevers, that said he feels like he is 

"burning up."  No sick contacts.  Did travel to [place] a few days ago.





Review of Systems


Constitutional: reports: Sweats.  denies: Fever, Chills


Cardiac: denies: Chest pain / pressure, Palpitations


Respiratory: denies: Dyspnea, Cough


GI: reports: Abdominal Pain (Cramps), Nausea, Vomiting, Diarrhea.  denies: 

Hematemesis, Bloody / black stool





PD PAST MEDICAL HISTORY





- Past Medical History


Cardiovascular: Hypertension, High cholesterol, Coronary artery disease, Atrial 

fibrillation


Respiratory: Sleep apnea, CPAP use


Endocrine/Autoimmune: HyPOthyroidism


GI: None, GERD


: Benign prostate hypertrophy, Retention, Frequency


HEENT: Chronic vision loss, Chronic hearing loss, Other


Psych: Depression


Musculoskeletal: Chronic back pain





- Past Surgical History


Past Surgical History: Yes


Ortho: Rotator cuff repair


/GYN: Other (bilateral orchiectomy)





- Present Medications


Home Medications: 


                                Ambulatory Orders











 Medication  Instructions  Recorded  Confirmed


 


Cholecalciferol (Vitamin D3) 1,000 unit PO BID 02/03/17 12/25/19





[Vitamin D3]   


 


Cyanocobalamin (Vitamin B-12) 1,000 mcg PO DAILY 02/03/17 12/25/19





[Vitamin B-12]   


 


Gabapentin 900 mg PO BID 02/03/17 12/25/19


 


Levothyroxine [Synthroid] 100 mcg PO QDAC 02/03/17 12/25/19


 


Omeprazole [PriLOSEC] 20 mg PO BID 02/03/17 12/25/19


 


Oxybutynin Chloride 10 mg PO DAILY 02/03/17 12/25/19


 


Potassium Chloride 20 meq PO DAILY PM 02/03/17 12/25/19


 


Aspirin 81 mg PO DAILY 12/24/19 12/25/19


 


Colchicine 0.6 mg PO PRN PRN 12/24/19 12/25/19


 


Duloxetine HCl [Cymbalta] 60 mg PO DAILY 12/24/19 12/25/19


 


Ferrous Sulfate 325 mg PO DAILY 12/24/19 12/25/19


 


Finasteride 5 mg PO QPM 12/24/19 12/25/19


 


Loratadine [Claritin] 10 mg PO QPM 12/24/19 12/25/19


 


Mirabegron [Myrbetriq] 25 mg PO DAILY 12/24/19 12/25/19


 


Tamsulosin [Flomax] 0.8 mg PO QPM 12/24/19 12/25/19


 


Testosterone [Androgel] 5 gm TOP DAILY 12/24/19 12/25/19


 


Trazodone HCl 300 mg PO QPM 12/24/19 12/24/19


 


Ascorbic Acid 1,000 mg PO BID 12/25/19 12/25/19


 


Atorvastatin Calcium 80 mg PO QPM 12/25/19 12/25/19


 


Calcium Carbonate 500 mg PO BID 12/25/19 12/25/19


 


Metoprolol Succinate [Toprol Xl] 25 mg PO BID #60 tab.er.24h 12/25/19 


 


Nystatin 1 each MC BID #14 powder.ea. 12/25/19 


 


Warfarin Sodium 2 mg PO TUWE@2100 12/25/19 12/25/19


 


Warfarin Sodium 4 mg PO SUMOTHFRSA@2100 12/25/19 12/25/19


 


Dicyclomine [Bentyl] 1 - 2 tab PO QID PRN #20 capsule 08/03/20 


 


Loperamide [Imodium] 2 mg PO QID PRN #10 capsule 08/03/20 


 


Ondansetron Odt [Zofran] 4 mg TL Q6H PRN #10 tablet 08/03/20 














- Allergies


Allergies/Adverse Reactions: 


                                    Allergies











Allergy/AdvReac Type Severity Reaction Status Date / Time


 


No Known Drug Allergies Allergy   Verified 08/03/20 12:55














- Social History


Does the pt smoke?: No


Smoking Status: Never smoker


Does the pt drink ETOH?: No


Does the pt have substance abuse?: No





- Immunizations


Immunizations are current?: Yes





- POLST


Patient has POLST: No


POLST Status: DNR





PD ED PE NORMAL





- Vitals


Vital signs reviewed: Yes





- General


General: Alert and oriented X 3, Other (Very hard of hearing)





- Respiratory


Respiratory: No respiratory distress, Clear bilaterally





- Abdomen


Abdomen: Normal bowel sounds, Soft, Non tender





- Back


Back: No CVA TTP, No spinal TTP





- Neuro


Neuro: Alert and oriented X 3, Normal speech





Results





- Vitals


Vitals: 


                               Vital Signs - 24 hr











  08/03/20





  12:53


 


Temperature 36.6 C


 


Heart Rate 75


 


Respiratory 20





Rate 


 


Blood Pressure 157/87 H


 


O2 Saturation 94








                                     Oxygen











O2 Source                      Room air

















- Labs


Labs: 


                                Laboratory Tests











  08/03/20 08/03/20 08/03/20





  13:28 13:28 13:28


 


WBC  7.1  


 


RBC  3.98 L  


 


Hgb  12.5 L  


 


Hct  38.9 L  


 


MCV  97.7 H  


 


MCH  31.4 H  


 


MCHC  32.1  


 


RDW  14.8  


 


Plt Count  139  


 


MPV  8.7  


 


Neut # (Auto)  5.6  


 


Lymph # (Auto)  0.8 L  


 


Mono # (Auto)  0.5  


 


Eos # (Auto)  0.2  


 


Baso # (Auto)  0.0  


 


Absolute Nucleated RBC  0.00  


 


Nucleated RBC %  0.0  


 


Sodium   144 


 


Potassium   4.4 


 


Chloride   106 


 


Carbon Dioxide   27 


 


Anion Gap   11.0 


 


BUN   12 


 


Creatinine   1.2 


 


Estimated GFR (MDRD)   58 L 


 


Glucose   145 H 


 


Lactic Acid    1.6


 


Calcium   8.7 


 


Total Bilirubin   0.8 


 


AST   21 


 


ALT   24 


 


Alkaline Phosphatase   57 


 


Total Protein   6.7 


 


Albumin   3.8 


 


Globulin   2.9 


 


Albumin/Globulin Ratio   1.3 


 


Lipase   29 














PD MEDICAL DECISION MAKING





- ED course


ED course: 





79-year-old gentleman who presents with less than today's worth of vomiting and 

diarrhea as well as stomach cramps, benign abdomen.  Labs are reassuring.  

Feeling much better after IV fluids, Bentyl, and Imodium and Zofran.  Passed an 

oral challenge and remained nontender on repeat evaluation.  Family requested 

coronavirus testing although pretest probability is low we are doing it.





Departure





- Departure


Disposition: 01 Home, Self Care


Clinical Impression: 


 Gastroenteritis





Vomiting


Qualifiers:


 Vomiting type: unspecified Vomiting Intractability: non-intractable Nausea 

presence: with nausea Qualified Code(s): R11.2 - Nausea with vomiting, 

unspecified





Diarrhea


Qualifiers:


 Diarrhea type: presumed infectious Qualified Code(s): R19.7 - Diarrhea, 

unspecified





Condition: Good


Record reviewed to determine appropriate education?: Yes


Instructions:  ED Gastroenteritis Viral


Prescriptions: 


Dicyclomine [Bentyl] 1 - 2 tab PO QID PRN #20 capsule


 PRN Reason: Abdominal Pain


Loperamide [Imodium] 2 mg PO QID PRN #10 capsule


 PRN Reason: Diarrhea


Ondansetron Odt [Zofran] 4 mg TL Q6H PRN #10 tablet


 PRN Reason: Nausea / Vomiting


Comments: 


You were seen today for what sounds like classic viral gastroenteritis.  Luckily

 this sort of illness does not last long, generally a day to 36 hours.  Return 

if worse or if not better in that timeframe or if new symptoms develop.

## 2020-08-04 ENCOUNTER — HOSPITAL ENCOUNTER (EMERGENCY)
Dept: HOSPITAL 76 - ED | Age: 80
Discharge: HOME | End: 2020-08-04
Payer: MEDICARE

## 2020-08-04 VITALS — SYSTOLIC BLOOD PRESSURE: 151 MMHG | DIASTOLIC BLOOD PRESSURE: 76 MMHG

## 2020-08-04 DIAGNOSIS — Z79.82: ICD-10-CM

## 2020-08-04 DIAGNOSIS — R44.0: ICD-10-CM

## 2020-08-04 DIAGNOSIS — R44.1: Primary | ICD-10-CM

## 2020-08-04 DIAGNOSIS — I10: ICD-10-CM

## 2020-08-04 DIAGNOSIS — Z66: ICD-10-CM

## 2020-08-04 LAB
ALBUMIN DIAFP-MCNC: 3.9 G/DL (ref 3.2–5.5)
ALBUMIN/GLOB SERPL: 1.3 {RATIO} (ref 1–2.2)
ALP SERPL-CCNC: 64 IU/L (ref 42–121)
ALT SERPL W P-5'-P-CCNC: 22 IU/L (ref 10–60)
ANION GAP SERPL CALCULATED.4IONS-SCNC: 8 MMOL/L (ref 6–13)
AST SERPL W P-5'-P-CCNC: 19 IU/L (ref 10–42)
BASOPHILS NFR BLD AUTO: 0 10^3/UL (ref 0–0.1)
BASOPHILS NFR BLD AUTO: 0.4 %
BILIRUB BLD-MCNC: 0.9 MG/DL (ref 0.2–1)
BUN SERPL-MCNC: 11 MG/DL (ref 6–20)
CALCIUM UR-MCNC: 8.8 MG/DL (ref 8.5–10.3)
CHLORIDE SERPL-SCNC: 102 MMOL/L (ref 101–111)
CLARITY UR REFRACT.AUTO: CLEAR
CO2 SERPL-SCNC: 27 MMOL/L (ref 21–32)
CREAT SERPLBLD-SCNC: 1.2 MG/DL (ref 0.6–1.2)
EOSINOPHIL # BLD AUTO: 0.2 10^3/UL (ref 0–0.7)
EOSINOPHIL NFR BLD AUTO: 2 %
ERYTHROCYTE [DISTWIDTH] IN BLOOD BY AUTOMATED COUNT: 15.1 % (ref 12–15)
GLOBULIN SER-MCNC: 3.1 G/DL (ref 2.1–4.2)
GLUCOSE SERPL-MCNC: 120 MG/DL (ref 70–100)
GLUCOSE UR QL STRIP.AUTO: NEGATIVE MG/DL
HGB UR QL STRIP: 12.7 G/DL (ref 14–18)
KETONES UR QL STRIP.AUTO: NEGATIVE MG/DL
LIPASE SERPL-CCNC: 32 U/L (ref 22–51)
LYMPHOCYTES # SPEC AUTO: 1.1 10^3/UL (ref 1.5–3.5)
LYMPHOCYTES NFR BLD AUTO: 14.3 %
MCH RBC QN AUTO: 30.6 PG (ref 27–31)
MCHC RBC AUTO-ENTMCNC: 31.1 G/DL (ref 32–36)
MCV RBC AUTO: 98.6 FL (ref 80–94)
MONOCYTES # BLD AUTO: 0.5 10^3/UL (ref 0–1)
MONOCYTES NFR BLD AUTO: 6.8 %
NEUTROPHILS # BLD AUTO: 6 10^3/UL (ref 1.5–6.6)
NEUTROPHILS # SNV AUTO: 7.9 X10^3/UL (ref 4.8–10.8)
NEUTROPHILS NFR BLD AUTO: 76.1 %
NITRITE UR QL STRIP.AUTO: NEGATIVE
PDW BLD AUTO: 9 FL (ref 7.4–11.4)
PH UR STRIP.AUTO: 6.5 PH (ref 5–7.5)
PLATELET # BLD: 163 10^3/UL (ref 130–450)
PROT SPEC-MCNC: 7 G/DL (ref 6.7–8.2)
PROT UR STRIP.AUTO-MCNC: NEGATIVE MG/DL
RBC # UR STRIP.AUTO: NEGATIVE /UL
RBC MAR: 4.15 10^6/UL (ref 4.7–6.1)
SODIUM SERPLBLD-SCNC: 137 MMOL/L (ref 135–145)
SP GR UR STRIP.AUTO: 1.01 (ref 1–1.03)
UROBILINOGEN UR QL STRIP.AUTO: (no result) E.U./DL
UROBILINOGEN UR STRIP.AUTO-MCNC: NEGATIVE MG/DL

## 2020-08-04 PROCEDURE — 81001 URINALYSIS AUTO W/SCOPE: CPT

## 2020-08-04 PROCEDURE — 99283 EMERGENCY DEPT VISIT LOW MDM: CPT

## 2020-08-04 PROCEDURE — 80053 COMPREHEN METABOLIC PANEL: CPT

## 2020-08-04 PROCEDURE — 36415 COLL VENOUS BLD VENIPUNCTURE: CPT

## 2020-08-04 PROCEDURE — 99284 EMERGENCY DEPT VISIT MOD MDM: CPT

## 2020-08-04 PROCEDURE — 85025 COMPLETE CBC W/AUTO DIFF WBC: CPT

## 2020-08-04 PROCEDURE — 81003 URINALYSIS AUTO W/O SCOPE: CPT

## 2020-08-04 PROCEDURE — 83690 ASSAY OF LIPASE: CPT

## 2020-08-04 PROCEDURE — 87086 URINE CULTURE/COLONY COUNT: CPT

## 2020-08-04 NOTE — ED PHYSICIAN DOCUMENTATION
History of Present Illness





- Stated complaint


Stated Complaint: AMS





- Chief complaint


Chief Complaint: Abd Pain





- History obtained from


History obtained from: Patient, Family, EMS





- History of Present Illness


Timing: Prior to arrival, How many hours ago (8)





- Additonal information


Additional information: 


79-year-old male presents to the emergency department for chief complaint of 

visual and auditory hallucinations that were noted this a.m.  This gentleman was

seen in the ER yesterday with vomiting and diarrhea suspected to be due to a 

viral gastroenteritis.  He was discharged home with a prescription for 

loperamide, Zofran and Bentyl.  Since being seen in the emergency department his

wife reports that the vomiting and diarrhea has improved though he has been a 

little nauseated.





This a.m. he reported seeing people on the back porch that were not there.  He 

also told his wife that they entered the house.  His wife states that about 15 

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  Though they were never worked up.





He does have a history of atrial fibrillation.  He is not anticoagulated seconda

ry to a history of recurrent falls





In the room he is hard of hearing but alert.  He is oriented to person place and

time.  He does admit to seeing things that are not there and hearing music that 

he knows is not playing








Review of Systems


Constitutional: denies: Fever, Chills


Cardiac: denies: Chest pain / pressure, Palpitations


Respiratory: denies: Dyspnea, Cough


GI: reports: Nausea, Vomiting, Diarrhea


: denies: Dysuria, Frequency


Musculoskeletal: denies: Neck pain, Back pain


Neurologic: denies: Generalized weakness, Numbness, Difficulty speaking, 

Syncope, Seizure, Confused


Psychiatric: reports: Hallucinations (Auditory and visual hallucinations)





PD PAST MEDICAL HISTORY





- Past Medical History


Cardiovascular: Congestive heart failure, Hypertension, High cholesterol, 

Coronary artery disease, Atrial fibrillation


Respiratory: Sleep apnea, CPAP use


Neuro: None


Endocrine/Autoimmune: HyPOthyroidism


GI: GERD, Diverticulitis


: Benign prostate hypertrophy, Retention, Frequency


HEENT: Chronic vision loss, Chronic hearing loss, Other


Psych: Depression


Musculoskeletal: Chronic back pain


Derm: None


Other Past Medical History: testicular cancer





- Past Surgical History


Past Surgical History: Yes


Ortho: Rotator cuff repair, Shoulder arthroplasty


/GYN: Other (bilateral orchiectomy)


Cardiovascular: Coronary stent





- Present Medications


Home Medications: 


                                Ambulatory Orders











 Medication  Instructions  Recorded  Confirmed


 


Cholecalciferol (Vitamin D3) 1,000 unit PO BID 02/03/17 12/25/19





[Vitamin D3]   


 


Cyanocobalamin (Vitamin B-12) 1,000 mcg PO DAILY 02/03/17 12/25/19





[Vitamin B-12]   


 


Gabapentin 900 mg PO BID 02/03/17 12/25/19


 


Levothyroxine [Synthroid] 100 mcg PO QDAC 02/03/17 12/25/19


 


Omeprazole [PriLOSEC] 20 mg PO BID 02/03/17 12/25/19


 


Oxybutynin Chloride 10 mg PO DAILY 02/03/17 12/25/19


 


Potassium Chloride 20 meq PO DAILY PM 02/03/17 12/25/19


 


Aspirin 81 mg PO DAILY 12/24/19 12/25/19


 


Colchicine 0.6 mg PO PRN PRN 12/24/19 12/25/19


 


Duloxetine HCl [Cymbalta] 60 mg PO DAILY 12/24/19 12/25/19


 


Ferrous Sulfate 325 mg PO DAILY 12/24/19 12/25/19


 


Finasteride 5 mg PO QPM 12/24/19 12/25/19


 


Loratadine [Claritin] 10 mg PO QPM 12/24/19 12/25/19


 


Mirabegron [Myrbetriq] 25 mg PO DAILY 12/24/19 12/25/19


 


Tamsulosin [Flomax] 0.8 mg PO QPM 12/24/19 12/25/19


 


Testosterone [Androgel] 5 gm TOP DAILY 12/24/19 12/25/19


 


Trazodone HCl 300 mg PO QPM 12/24/19 12/24/19


 


Ascorbic Acid 1,000 mg PO BID 12/25/19 12/25/19


 


Atorvastatin Calcium 80 mg PO QPM 12/25/19 12/25/19


 


Calcium Carbonate 500 mg PO BID 12/25/19 12/25/19


 


Metoprolol Succinate [Toprol Xl] 25 mg PO BID #60 tab.er.24h 12/25/19 


 


Nystatin 1 each MC BID #14 powder.ea. 12/25/19 


 


Warfarin Sodium 2 mg PO TUWE@2100 12/25/19 12/25/19


 


Warfarin Sodium 4 mg PO SUMOTHFRSA@2100 12/25/19 12/25/19


 


Dicyclomine [Bentyl] 1 - 2 tab PO QID PRN #20 capsule 08/03/20 


 


Loperamide [Imodium] 2 mg PO QID PRN #10 capsule 08/03/20 


 


Ondansetron Odt [Zofran] 4 mg TL Q6H PRN #10 tablet 08/03/20 














- Allergies


Allergies/Adverse Reactions: 


                                    Allergies











Allergy/AdvReac Type Severity Reaction Status Date / Time


 


No Known Drug Allergies Allergy   Verified 08/04/20 16:10














- Social History


Does the pt smoke?: No


Smoking Status: Never smoker


Does the pt drink ETOH?: No


Does the pt have substance abuse?: No





- Immunizations


Immunizations are current?: Yes





- POLST


Patient has POLST: No


POLST Status: DNR





PD ED PE EXPANDED





- General


General: Alert, No acute distress, Well developed/nourished





- Neck


Neck: Supple w/out meningeal sx.  No: Adenopathy, Limited ROM





- Cardiac


Cardiac: Irregularly irregular, Murmur Present, Radial strong equal, Femoral 

strong equal, Cap refill < 2 sec.  No: Prolonged cap refill





- Respiratory


Respiratory: Clear to ausultation diane.  No: Distress, Labored, Retractions, 

Wheezing, Rhonchi





- Abdomen


Abdomen: Normal Bowel sounds, Generalized/diffuse (Mildly tender but nonfocal.).

  No: Hyperactive BS, Epigastric





- Derm


Derm: Normal color, Warm and dry, Pale





- Extremities


Extremities: Normal, Deformity, Tenderness





- Neuro


Neuro: Alert and Oriented X 3, CNII-XII intact.  No: Confused, Disoriented, L

ethargic, Weakness, Abnormal sensation, Aphasia





- GCS


Eye Opening: Spontaneous


Motor: Obeys Commands


Verbal: Oriented


Total: 15





- Psych


Psych: Auditory hallucinations (Reports seeing people on his back porch that 

entered the house that were not there.  Also endorses hearing popular music that

 he knows is not playing), Visual hallucinations





Results





- Vitals


Vitals: 


                               Vital Signs - 24 hr











  08/04/20 08/04/20





  16:10 16:21


 


Temperature 36.8 C 


 


Heart Rate 90 82


 


Respiratory 20 19





Rate  


 


Blood Pressure 153/134 H 162/85 H


 


O2 Saturation 88 L 98








                                     Oxygen











O2 Source                      Nasal cannula

















- Labs


Labs: 


                                Laboratory Tests











  08/04/20 08/04/20 08/04/20





  16:25 16:25 16:30


 


WBC  7.9  


 


RBC  4.15 L  


 


Hgb  12.7 L  


 


Hct  40.9 L  


 


MCV  98.6 H  


 


MCH  30.6  


 


MCHC  31.1 L  


 


RDW  15.1 H  


 


Plt Count  163  


 


MPV  9.0  


 


Neut # (Auto)  6.0  


 


Lymph # (Auto)  1.1 L  


 


Mono # (Auto)  0.5  


 


Eos # (Auto)  0.2  


 


Baso # (Auto)  0.0  


 


Absolute Nucleated RBC  0.00  


 


Nucleated RBC %  0.0  


 


Sodium   137 


 


Potassium   4.4 


 


Chloride   102 


 


Carbon Dioxide   27 


 


Anion Gap   8.0 


 


BUN   11 


 


Creatinine   1.2 


 


Estimated GFR (MDRD)   58 L 


 


Glucose   120 H 


 


Calcium   8.8 


 


Total Bilirubin   0.9 


 


AST   19 


 


ALT   22 


 


Alkaline Phosphatase   64 


 


Total Protein   7.0 


 


Albumin   3.9 


 


Globulin   3.1 


 


Albumin/Globulin Ratio   1.3 


 


Lipase   32 


 


Urine Color    YELLOW


 


Urine Clarity    CLEAR


 


Urine pH    6.5


 


Ur Specific Gravity    1.015


 


Urine Protein    NEGATIVE


 


Urine Glucose (UA)    NEGATIVE


 


Urine Ketones    NEGATIVE


 


Urine Occult Blood    NEGATIVE


 


Urine Nitrite    NEGATIVE


 


Urine Bilirubin    NEGATIVE


 


Urine Urobilinogen    0.2 (NORMAL)


 


Ur Leukocyte Esterase    NEGATIVE


 


Ur Microscopic Review    NOT INDICATED


 


Urine Culture Comments    NOT INDICATED














PD MEDICAL DECISION MAKING





- ED course


Complexity details: reviewed results, re-evaluated patient, d/w patient, d/w 

family


ED course: 





79-year-old male presents to the emergency department for evaluation of both 

auditory and visual hallucinations that were noted this morning.  He was seen in

 the ED yesterday for vomiting and diarrhea thought to be a viral 

gastroenteritis.  Since being seen yesterday the vomiting and diarrhea removed. 

 His wife reports that the only new medication that he has actually taken was 

the Zofran.  In a chart review this can be associated with hallucinations.


- He has no obvious focal deficits.  And is otherwise neurologically intact


- His labs are reviewed.  He does not appear dehydrated.  He has no urinary 

tract infection.


- His wife denies any alcohol use and they also deny that he has missed any of 

his regular medication doses.


- Of note they do endorse that he briefly had hallucinations about 15 years ago 

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  Those were self-limiting and they

 did not seek evaluation for them


- He is clinically stable for discharge home if his hallucinations worsen, he 

becomes unsafe or has any other physical signs such as worsening diarrhea or 

focal neuro deficits he is to return to the emergency department Family was 

advised close follow-up with a primary care provider





Departure





- Departure


Disposition: 01 Home, Self Care


Clinical Impression: 


 Hallucinations





Condition: Stable


Follow-Up: 


Aryan Esparza MD [Primary Care Provider] - 


Comments: 


Manuelito's labs today look pretty normal.  He is not dehydrated.  There is no 

infection in his urine.





The hallucinations may be a side effect of the medication ordered yesterday.  

Please do not give him any further doses of Zofran and do not give him the 

loperamide or Bentyl. 





I would like you to follow-up this ED visit with his primary care doctor shortly

if his symptoms are worsening return to the emergency department.  Return to the

emergency department for uncontrolled vomiting and diarrhea, any slurred speech 

or facial droop

## 2021-01-13 ENCOUNTER — HOSPITAL ENCOUNTER (OUTPATIENT)
Dept: HOSPITAL 76 - EMS | Age: 81
End: 2021-01-13
Attending: SURGERY
Payer: MEDICARE

## 2021-01-13 DIAGNOSIS — Z03.89: Primary | ICD-10-CM

## 2021-01-14 ENCOUNTER — HOSPITAL ENCOUNTER (OUTPATIENT)
Dept: HOSPITAL 76 - EMS | Age: 81
Discharge: ANOTHER HOSPITAL | End: 2021-01-14
Attending: SURGERY
Payer: MEDICARE
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
## 2021-01-14 DIAGNOSIS — R53.1: ICD-10-CM
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
## 2021-01-14 DIAGNOSIS — S09.90XA: Primary | ICD-10-CM

## 2021-01-14 DIAGNOSIS — R06.02: ICD-10-CM

## 2021-01-19 ENCOUNTER — HOSPITAL ENCOUNTER (OUTPATIENT)
Dept: HOSPITAL 76 - EMS | Age: 81
End: 2021-01-19
Attending: SURGERY
Payer: MEDICARE

## 2021-01-19 DIAGNOSIS — Z03.89: Primary | ICD-10-CM
